# Patient Record
Sex: MALE | Race: WHITE | Employment: OTHER | URBAN - METROPOLITAN AREA
[De-identification: names, ages, dates, MRNs, and addresses within clinical notes are randomized per-mention and may not be internally consistent; named-entity substitution may affect disease eponyms.]

---

## 2017-04-23 ENCOUNTER — HOSPITAL ENCOUNTER (INPATIENT)
Age: 62
LOS: 3 days | Discharge: HOME | DRG: 247 | End: 2017-04-26
Attending: EMERGENCY MEDICINE | Admitting: INTERNAL MEDICINE
Payer: COMMERCIAL

## 2017-04-23 ENCOUNTER — APPOINTMENT (OUTPATIENT)
Dept: GENERAL RADIOLOGY | Age: 62
DRG: 247 | End: 2017-04-23
Attending: EMERGENCY MEDICINE
Payer: COMMERCIAL

## 2017-04-23 DIAGNOSIS — I21.09 ST ELEVATION MYOCARDIAL INFARCTION (STEMI) OF ANTERIOR WALL (HCC): Primary | ICD-10-CM

## 2017-04-23 PROBLEM — Z91.199 H/O NONCOMPLIANCE WITH MEDICAL TREATMENT, PRESENTING HAZARDS TO HEALTH: Status: ACTIVE | Noted: 2017-04-23

## 2017-04-23 PROBLEM — I21.11 ST ELEVATION (STEMI) MYOCARDIAL INFARCTION INVOLVING RIGHT CORONARY ARTERY (HCC): Status: ACTIVE | Noted: 2017-04-23

## 2017-04-23 PROBLEM — I21.11 ACUTE ST ELEVATION MYOCARDIAL INFARCTION (STEMI) INVOLVING RIGHT CORONARY ARTERY (HCC): Status: ACTIVE | Noted: 2017-04-23

## 2017-04-23 LAB
ALBUMIN SERPL BCP-MCNC: 3.2 G/DL (ref 3.5–5)
ALBUMIN/GLOB SERPL: 1 {RATIO} (ref 1.1–2.2)
ALP SERPL-CCNC: 75 U/L (ref 45–117)
ALT SERPL-CCNC: 17 U/L (ref 12–78)
ANION GAP BLD CALC-SCNC: 9 MMOL/L (ref 5–15)
APTT PPP: 29.1 SEC (ref 22.1–32.5)
AST SERPL W P-5'-P-CCNC: 10 U/L (ref 15–37)
BASOPHILS # BLD AUTO: 0 K/UL (ref 0–0.1)
BASOPHILS # BLD: 0 % (ref 0–1)
BILIRUB SERPL-MCNC: 0.4 MG/DL (ref 0.2–1)
BUN SERPL-MCNC: 16 MG/DL (ref 6–20)
BUN/CREAT SERPL: 12 (ref 12–20)
CALCIUM SERPL-MCNC: 9 MG/DL (ref 8.5–10.1)
CHLORIDE SERPL-SCNC: 106 MMOL/L (ref 97–108)
CO2 SERPL-SCNC: 28 MMOL/L (ref 21–32)
CREAT SERPL-MCNC: 1.31 MG/DL (ref 0.7–1.3)
EOSINOPHIL # BLD: 0.3 K/UL (ref 0–0.4)
EOSINOPHIL NFR BLD: 3 % (ref 0–7)
ERYTHROCYTE [DISTWIDTH] IN BLOOD BY AUTOMATED COUNT: 13.2 % (ref 11.5–14.5)
GLOBULIN SER CALC-MCNC: 3.3 G/DL (ref 2–4)
GLUCOSE SERPL-MCNC: 114 MG/DL (ref 65–100)
HCT VFR BLD AUTO: 49.3 % (ref 36.6–50.3)
HGB BLD-MCNC: 16.6 G/DL (ref 12.1–17)
INR PPP: 1 (ref 0.9–1.1)
LYMPHOCYTES # BLD AUTO: 22 % (ref 12–49)
LYMPHOCYTES # BLD: 2.3 K/UL (ref 0.8–3.5)
MCH RBC QN AUTO: 31 PG (ref 26–34)
MCHC RBC AUTO-ENTMCNC: 33.7 G/DL (ref 30–36.5)
MCV RBC AUTO: 92.1 FL (ref 80–99)
MONOCYTES # BLD: 0.6 K/UL (ref 0–1)
MONOCYTES NFR BLD AUTO: 6 % (ref 5–13)
NEUTS SEG # BLD: 7.2 K/UL (ref 1.8–8)
NEUTS SEG NFR BLD AUTO: 69 % (ref 32–75)
PLATELET # BLD AUTO: 198 K/UL (ref 150–400)
POTASSIUM SERPL-SCNC: 4.4 MMOL/L (ref 3.5–5.1)
PROT SERPL-MCNC: 6.5 G/DL (ref 6.4–8.2)
PROTHROMBIN TIME: 10.5 SEC (ref 9–11.1)
RBC # BLD AUTO: 5.35 M/UL (ref 4.1–5.7)
SODIUM SERPL-SCNC: 143 MMOL/L (ref 136–145)
THERAPEUTIC RANGE,PTTT: NORMAL SECS (ref 58–77)
TROPONIN I SERPL-MCNC: <0.04 NG/ML
WBC # BLD AUTO: 10.4 K/UL (ref 4.1–11.1)

## 2017-04-23 PROCEDURE — 80053 COMPREHEN METABOLIC PANEL: CPT | Performed by: EMERGENCY MEDICINE

## 2017-04-23 PROCEDURE — C1769 GUIDE WIRE: HCPCS

## 2017-04-23 PROCEDURE — 96374 THER/PROPH/DIAG INJ IV PUSH: CPT

## 2017-04-23 PROCEDURE — B2111ZZ FLUOROSCOPY OF MULTIPLE CORONARY ARTERIES USING LOW OSMOLAR CONTRAST: ICD-10-PCS | Performed by: INTERNAL MEDICINE

## 2017-04-23 PROCEDURE — 84484 ASSAY OF TROPONIN QUANT: CPT | Performed by: EMERGENCY MEDICINE

## 2017-04-23 PROCEDURE — 74011000250 HC RX REV CODE- 250

## 2017-04-23 PROCEDURE — 77030019698 HC SYR ANGI MDLON MRTM -A

## 2017-04-23 PROCEDURE — 36415 COLL VENOUS BLD VENIPUNCTURE: CPT | Performed by: EMERGENCY MEDICINE

## 2017-04-23 PROCEDURE — 71010 XR CHEST PORT: CPT

## 2017-04-23 PROCEDURE — 3E033PZ INTRODUCTION OF PLATELET INHIBITOR INTO PERIPHERAL VEIN, PERCUTANEOUS APPROACH: ICD-10-PCS | Performed by: INTERNAL MEDICINE

## 2017-04-23 PROCEDURE — 74011250637 HC RX REV CODE- 250/637: Performed by: EMERGENCY MEDICINE

## 2017-04-23 PROCEDURE — C1725 CATH, TRANSLUMIN NON-LASER: HCPCS

## 2017-04-23 PROCEDURE — C1874 STENT, COATED/COV W/DEL SYS: HCPCS

## 2017-04-23 PROCEDURE — 77030019569 HC BND COMPR RAD TERU -B

## 2017-04-23 PROCEDURE — 65660000000 HC RM CCU STEPDOWN

## 2017-04-23 PROCEDURE — 74011250637 HC RX REV CODE- 250/637: Performed by: INTERNAL MEDICINE

## 2017-04-23 PROCEDURE — 93041 RHYTHM ECG TRACING: CPT

## 2017-04-23 PROCEDURE — 85730 THROMBOPLASTIN TIME PARTIAL: CPT | Performed by: EMERGENCY MEDICINE

## 2017-04-23 PROCEDURE — 99152 MOD SED SAME PHYS/QHP 5/>YRS: CPT

## 2017-04-23 PROCEDURE — B2151ZZ FLUOROSCOPY OF LEFT HEART USING LOW OSMOLAR CONTRAST: ICD-10-PCS | Performed by: INTERNAL MEDICINE

## 2017-04-23 PROCEDURE — B246ZZZ ULTRASONOGRAPHY OF RIGHT AND LEFT HEART: ICD-10-PCS | Performed by: INTERNAL MEDICINE

## 2017-04-23 PROCEDURE — 77030019697 HC SYR ANGI INFL MRTM -B

## 2017-04-23 PROCEDURE — 99285 EMERGENCY DEPT VISIT HI MDM: CPT

## 2017-04-23 PROCEDURE — 77030010221 HC SPLNT WR POS TELE -B

## 2017-04-23 PROCEDURE — 93005 ELECTROCARDIOGRAM TRACING: CPT

## 2017-04-23 PROCEDURE — C1894 INTRO/SHEATH, NON-LASER: HCPCS

## 2017-04-23 PROCEDURE — C1887 CATHETER, GUIDING: HCPCS

## 2017-04-23 PROCEDURE — 96375 TX/PRO/DX INJ NEW DRUG ADDON: CPT

## 2017-04-23 PROCEDURE — 85610 PROTHROMBIN TIME: CPT | Performed by: EMERGENCY MEDICINE

## 2017-04-23 PROCEDURE — 85025 COMPLETE CBC W/AUTO DIFF WBC: CPT | Performed by: EMERGENCY MEDICINE

## 2017-04-23 PROCEDURE — 74011250636 HC RX REV CODE- 250/636: Performed by: INTERNAL MEDICINE

## 2017-04-23 PROCEDURE — 74011636320 HC RX REV CODE- 636/320

## 2017-04-23 PROCEDURE — 74011250636 HC RX REV CODE- 250/636

## 2017-04-23 PROCEDURE — 74011250636 HC RX REV CODE- 250/636: Performed by: EMERGENCY MEDICINE

## 2017-04-23 PROCEDURE — 027034Z DILATION OF CORONARY ARTERY, ONE ARTERY WITH DRUG-ELUTING INTRALUMINAL DEVICE, PERCUTANEOUS APPROACH: ICD-10-PCS | Performed by: INTERNAL MEDICINE

## 2017-04-23 PROCEDURE — 74011000258 HC RX REV CODE- 258: Performed by: INTERNAL MEDICINE

## 2017-04-23 PROCEDURE — 4A023N7 MEASUREMENT OF CARDIAC SAMPLING AND PRESSURE, LEFT HEART, PERCUTANEOUS APPROACH: ICD-10-PCS | Performed by: INTERNAL MEDICINE

## 2017-04-23 PROCEDURE — 77030004549 HC CATH ANGI DX PRF MRTM -A

## 2017-04-23 PROCEDURE — 77030015766

## 2017-04-23 PROCEDURE — 77010033678 HC OXYGEN DAILY

## 2017-04-23 RX ORDER — HEPARIN SODIUM 200 [USP'U]/100ML
INJECTION, SOLUTION INTRAVENOUS
Status: COMPLETED
Start: 2017-04-23 | End: 2017-04-23

## 2017-04-23 RX ORDER — HEPARIN SODIUM 1000 [USP'U]/ML
2500 INJECTION, SOLUTION INTRAVENOUS; SUBCUTANEOUS ONCE
Status: COMPLETED | OUTPATIENT
Start: 2017-04-23 | End: 2017-04-23

## 2017-04-23 RX ORDER — ATROPINE SULFATE 0.1 MG/ML
INJECTION INTRAVENOUS
Status: DISCONTINUED
Start: 2017-04-23 | End: 2017-04-23 | Stop reason: ALTCHOICE

## 2017-04-23 RX ORDER — SODIUM CHLORIDE 0.9 % (FLUSH) 0.9 %
5-10 SYRINGE (ML) INJECTION AS NEEDED
Status: DISCONTINUED | OUTPATIENT
Start: 2017-04-23 | End: 2017-04-26 | Stop reason: HOSPADM

## 2017-04-23 RX ORDER — MORPHINE SULFATE 2 MG/ML
2 INJECTION, SOLUTION INTRAMUSCULAR; INTRAVENOUS
Status: DISCONTINUED | OUTPATIENT
Start: 2017-04-23 | End: 2017-04-23 | Stop reason: ALTCHOICE

## 2017-04-23 RX ORDER — HEPARIN SODIUM 200 [USP'U]/100ML
500 INJECTION, SOLUTION INTRAVENOUS ONCE
Status: COMPLETED | OUTPATIENT
Start: 2017-04-23 | End: 2017-04-23

## 2017-04-23 RX ORDER — HEPARIN SODIUM 1000 [USP'U]/ML
INJECTION, SOLUTION INTRAVENOUS; SUBCUTANEOUS
Status: COMPLETED
Start: 2017-04-23 | End: 2017-04-23

## 2017-04-23 RX ORDER — SODIUM CHLORIDE 9 MG/ML
125 INJECTION, SOLUTION INTRAVENOUS CONTINUOUS
Status: DISCONTINUED | OUTPATIENT
Start: 2017-04-23 | End: 2017-04-26 | Stop reason: HOSPADM

## 2017-04-23 RX ORDER — ATROPINE SULFATE 0.1 MG/ML
1 INJECTION INTRAVENOUS ONCE
Status: COMPLETED | OUTPATIENT
Start: 2017-04-23 | End: 2017-04-23

## 2017-04-23 RX ORDER — ATORVASTATIN CALCIUM 40 MG/1
40 TABLET, FILM COATED ORAL DAILY
Status: DISCONTINUED | OUTPATIENT
Start: 2017-04-23 | End: 2017-04-26 | Stop reason: HOSPADM

## 2017-04-23 RX ORDER — ALFUZOSIN HYDROCHLORIDE 10 MG/1
TABLET, EXTENDED RELEASE ORAL DAILY
COMMUNITY
End: 2020-06-10

## 2017-04-23 RX ORDER — HEPARIN SODIUM 5000 [USP'U]/ML
5000 INJECTION, SOLUTION INTRAVENOUS; SUBCUTANEOUS
Status: COMPLETED | OUTPATIENT
Start: 2017-04-23 | End: 2017-04-23

## 2017-04-23 RX ORDER — FENTANYL CITRATE 50 UG/ML
INJECTION, SOLUTION INTRAMUSCULAR; INTRAVENOUS
Status: COMPLETED
Start: 2017-04-23 | End: 2017-04-23

## 2017-04-23 RX ORDER — LIDOCAINE HYDROCHLORIDE 10 MG/ML
INJECTION, SOLUTION EPIDURAL; INFILTRATION; INTRACAUDAL; PERINEURAL
Status: COMPLETED
Start: 2017-04-23 | End: 2017-04-23

## 2017-04-23 RX ORDER — LIDOCAINE HYDROCHLORIDE 10 MG/ML
1-30 INJECTION, SOLUTION EPIDURAL; INFILTRATION; INTRACAUDAL; PERINEURAL
Status: DISCONTINUED | OUTPATIENT
Start: 2017-04-23 | End: 2017-04-23 | Stop reason: ALTCHOICE

## 2017-04-23 RX ORDER — AMIODARONE HYDROCHLORIDE 150 MG/3ML
INJECTION, SOLUTION INTRAVENOUS
Status: DISCONTINUED
Start: 2017-04-23 | End: 2017-04-23 | Stop reason: ALTCHOICE

## 2017-04-23 RX ORDER — LISINOPRIL 5 MG/1
2.5 TABLET ORAL DAILY
Status: DISCONTINUED | OUTPATIENT
Start: 2017-04-24 | End: 2017-04-26 | Stop reason: HOSPADM

## 2017-04-23 RX ORDER — METOPROLOL TARTRATE 25 MG/1
12.5 TABLET, FILM COATED ORAL EVERY 12 HOURS
Status: DISCONTINUED | OUTPATIENT
Start: 2017-04-23 | End: 2017-04-26 | Stop reason: HOSPADM

## 2017-04-23 RX ORDER — SODIUM CHLORIDE 0.9 % (FLUSH) 0.9 %
5-10 SYRINGE (ML) INJECTION EVERY 8 HOURS
Status: DISCONTINUED | OUTPATIENT
Start: 2017-04-23 | End: 2017-04-26 | Stop reason: HOSPADM

## 2017-04-23 RX ORDER — SODIUM CHLORIDE 900 MG/100ML
INJECTION INTRAVENOUS
Status: DISCONTINUED
Start: 2017-04-23 | End: 2017-04-26 | Stop reason: HOSPADM

## 2017-04-23 RX ORDER — GUAIFENESIN 100 MG/5ML
81 LIQUID (ML) ORAL DAILY
Status: DISCONTINUED | OUTPATIENT
Start: 2017-04-24 | End: 2017-04-26 | Stop reason: HOSPADM

## 2017-04-23 RX ORDER — MORPHINE SULFATE 2 MG/ML
2 INJECTION, SOLUTION INTRAMUSCULAR; INTRAVENOUS
Status: COMPLETED | OUTPATIENT
Start: 2017-04-23 | End: 2017-04-23

## 2017-04-23 RX ORDER — FENTANYL CITRATE 50 UG/ML
25-50 INJECTION, SOLUTION INTRAMUSCULAR; INTRAVENOUS
Status: DISCONTINUED | OUTPATIENT
Start: 2017-04-23 | End: 2017-04-23 | Stop reason: ALTCHOICE

## 2017-04-23 RX ORDER — VERAPAMIL HYDROCHLORIDE 2.5 MG/ML
2.5 INJECTION, SOLUTION INTRAVENOUS ONCE
Status: COMPLETED | OUTPATIENT
Start: 2017-04-23 | End: 2017-04-23

## 2017-04-23 RX ORDER — NITROGLYCERIN 20 MG/100ML
5 INJECTION INTRAVENOUS
Status: DISCONTINUED | OUTPATIENT
Start: 2017-04-23 | End: 2017-04-23 | Stop reason: ALTCHOICE

## 2017-04-23 RX ORDER — ALFUZOSIN HYDROCHLORIDE 10 MG/1
10 TABLET, EXTENDED RELEASE ORAL DAILY
COMMUNITY
End: 2017-04-26

## 2017-04-23 RX ORDER — BIVALIRUDIN 250 MG/5ML
INJECTION, POWDER, LYOPHILIZED, FOR SOLUTION INTRAVENOUS
Status: DISCONTINUED
Start: 2017-04-23 | End: 2017-04-26 | Stop reason: HOSPADM

## 2017-04-23 RX ORDER — VERAPAMIL HYDROCHLORIDE 2.5 MG/ML
INJECTION, SOLUTION INTRAVENOUS
Status: COMPLETED
Start: 2017-04-23 | End: 2017-04-23

## 2017-04-23 RX ORDER — MORPHINE SULFATE 2 MG/ML
INJECTION, SOLUTION INTRAMUSCULAR; INTRAVENOUS
Status: COMPLETED
Start: 2017-04-23 | End: 2017-04-23

## 2017-04-23 RX ORDER — MORPHINE SULFATE 10 MG/ML
2 INJECTION, SOLUTION INTRAMUSCULAR; INTRAVENOUS
Status: DISCONTINUED | OUTPATIENT
Start: 2017-04-23 | End: 2017-04-26 | Stop reason: HOSPADM

## 2017-04-23 RX ORDER — MIDAZOLAM HYDROCHLORIDE 1 MG/ML
INJECTION, SOLUTION INTRAMUSCULAR; INTRAVENOUS
Status: COMPLETED
Start: 2017-04-23 | End: 2017-04-23

## 2017-04-23 RX ORDER — ONDANSETRON 2 MG/ML
4 INJECTION INTRAMUSCULAR; INTRAVENOUS
Status: COMPLETED | OUTPATIENT
Start: 2017-04-23 | End: 2017-04-23

## 2017-04-23 RX ORDER — MIDAZOLAM HYDROCHLORIDE 1 MG/ML
.5-2 INJECTION, SOLUTION INTRAMUSCULAR; INTRAVENOUS
Status: DISCONTINUED | OUTPATIENT
Start: 2017-04-23 | End: 2017-04-23 | Stop reason: ALTCHOICE

## 2017-04-23 RX ADMIN — SODIUM CHLORIDE 125 ML/HR: 900 INJECTION, SOLUTION INTRAVENOUS at 14:07

## 2017-04-23 RX ADMIN — Medication 10 ML: at 20:48

## 2017-04-23 RX ADMIN — ONDANSETRON HYDROCHLORIDE 4 MG: 2 INJECTION, SOLUTION INTRAMUSCULAR; INTRAVENOUS at 13:47

## 2017-04-23 RX ADMIN — IOPAMIDOL 130 ML: 755 INJECTION, SOLUTION INTRAVENOUS at 15:13

## 2017-04-23 RX ADMIN — Medication 5 ML: at 17:00

## 2017-04-23 RX ADMIN — MIDAZOLAM HYDROCHLORIDE 2 MG: 1 INJECTION, SOLUTION INTRAMUSCULAR; INTRAVENOUS at 14:54

## 2017-04-23 RX ADMIN — HEPARIN SODIUM 1000 UNITS: 200 INJECTION, SOLUTION INTRAVENOUS at 14:38

## 2017-04-23 RX ADMIN — ATORVASTATIN CALCIUM 40 MG: 40 TABLET, FILM COATED ORAL at 16:29

## 2017-04-23 RX ADMIN — FENTANYL CITRATE 50 MCG: 50 INJECTION, SOLUTION INTRAMUSCULAR; INTRAVENOUS at 14:20

## 2017-04-23 RX ADMIN — BIVALIRUDIN 0.5 MG/KG/HR: 250 INJECTION, POWDER, LYOPHILIZED, FOR SOLUTION INTRAVENOUS at 14:59

## 2017-04-23 RX ADMIN — HEPARIN SODIUM 2500 UNITS: 1000 INJECTION, SOLUTION INTRAVENOUS; SUBCUTANEOUS at 14:30

## 2017-04-23 RX ADMIN — LIDOCAINE HYDROCHLORIDE 1 ML: 10 INJECTION, SOLUTION EPIDURAL; INFILTRATION; INTRACAUDAL; PERINEURAL at 14:29

## 2017-04-23 RX ADMIN — Medication 2 MG: at 14:01

## 2017-04-23 RX ADMIN — VERAPAMIL HYDROCHLORIDE 2.5 MG: 2.5 INJECTION, SOLUTION INTRAVENOUS at 14:30

## 2017-04-23 RX ADMIN — VERAPAMIL HYDROCHLORIDE 2.5 MG: 2.5 INJECTION INTRAVENOUS at 14:30

## 2017-04-23 RX ADMIN — MIDAZOLAM HYDROCHLORIDE 2 MG: 1 INJECTION, SOLUTION INTRAMUSCULAR; INTRAVENOUS at 14:20

## 2017-04-23 RX ADMIN — FENTANYL CITRATE 50 MCG: 50 INJECTION, SOLUTION INTRAMUSCULAR; INTRAVENOUS at 14:35

## 2017-04-23 RX ADMIN — MORPHINE SULFATE 2 MG: 10 INJECTION INTRAMUSCULAR; INTRAVENOUS; SUBCUTANEOUS at 20:52

## 2017-04-23 RX ADMIN — TICAGRELOR 180 MG: 90 TABLET ORAL at 14:02

## 2017-04-23 RX ADMIN — MIDAZOLAM HYDROCHLORIDE 2 MG: 1 INJECTION INTRAMUSCULAR; INTRAVENOUS at 14:20

## 2017-04-23 RX ADMIN — HEPARIN SODIUM 5000 UNITS: 5000 INJECTION, SOLUTION INTRAVENOUS; SUBCUTANEOUS at 13:46

## 2017-04-23 RX ADMIN — ATROPINE SULFATE 1 MG: 0.1 INJECTION PARENTERAL at 14:43

## 2017-04-23 RX ADMIN — NITROGLYCERIN 200 MCG: 5 INJECTION, SOLUTION INTRAVENOUS at 14:30

## 2017-04-23 RX ADMIN — MIDAZOLAM HYDROCHLORIDE 1 MG: 1 INJECTION, SOLUTION INTRAMUSCULAR; INTRAVENOUS at 14:33

## 2017-04-23 RX ADMIN — Medication 2 MG: at 13:48

## 2017-04-23 RX ADMIN — BIVALIRUDIN 1.75 MG/KG/HR: 250 INJECTION, POWDER, LYOPHILIZED, FOR SOLUTION INTRAVENOUS at 14:36

## 2017-04-23 RX ADMIN — METOPROLOL TARTRATE 12.5 MG: 25 TABLET ORAL at 16:29

## 2017-04-23 NOTE — ED TRIAGE NOTES
Pt arrives via EMS as pre-alert STEMI by EMS    Pt cutting grass and developed chest pain approx 15 min PTA of EMS Pt reports hx of MI with stent placement approx 5-6 years ago Pt received 2 ASA and 2 SL Nitro via EMS Pt arrives with IV 18 gauge in L AC 1 L of NS infused during transport.      Dr Mahnaz De La Rosa bedside upon arrival EKG completed upon arrival.

## 2017-04-23 NOTE — ED NOTES
Pt reports no relief from IV Morphine Dr Jaydon Young aware   Dr Jaydon Young speaking with Cardiology on phone regarding pt pain and further plan.

## 2017-04-23 NOTE — ED NOTES
Dr Kayden Duque and Cath lab bedside Report to cath lab personal     Pt signed consents with cath lab personal     Nitro sent to cath lab with cath team and MD

## 2017-04-23 NOTE — PROGRESS NOTES
TRANSFER - OUT REPORT:    Verbal report given to Joelle(name) on Petr Shettys.  being transferred to [facility](unit) for routine progression of care       Report consisted of patients Situation, Background, Assessment and   Recommendations(SBAR). Information from the following report(s) SBAR, Kardex, Procedure Summary, Intake/Output and MAR was reviewed with the receiving nurse. Opportunity for questions and clarification was provided.       Patient transported with:   Registered Nurse  Tech

## 2017-04-23 NOTE — IP AVS SNAPSHOT
[address] [facility] 
[phone] Patient: Joanna Polanco. MRN: [id number] YCS:7[birth year]38/[birth year]173 You are allergic to the following Allergen Reactions Pcn (Penicillins) Swelling Recent Documentation Height Weight BMI  
  
  
 1.778 m 81.1 kg 25.65 kg/m2 Unresulted Labs Order Current Status SAMPLE TO BLOOD BANK In process Emergency Contacts Name Discharge Info Relation Home Work Mobile Henny Clement  Spouse [3] [phone] About your hospitalization You were admitted on:  April 23, 2017 You last received care in the:  [facility] 2 INTRVNTNL CARDIO You were discharged on:  April 26, 2017 Unit phone number:  [phone] Why you were hospitalized Your primary diagnosis was: St Elevation [facility]) Myocardial Infarction Involving Right Coronary Artery (Hcc) Your diagnoses also included: Tobacco Use Disorder, Coronary Artery Disease, Hyperlipidemia With Target Low Density Lipoprotein (Ldl) Cholesterol Less Than 70 Mg/Dl, S/P Ptca (Percutaneous Transluminal Coronary Angioplasty), Acute St Elevation Myocardial Infarction (Stemi) Involving Right Coronary Artery (Hcc), H/O Noncompliance With Medical Treatment, Presenting Hazards To Health Providers Seen During Your Hospitalizations Provider Role Specialty Primary office phone Tejas Saucedo MD Attending Provider Emergency Medicine [phone] Camila Contreras MD Attending Provider Cardiology [phone] Your Primary Care Physician (PCP) Primary Care Physician Office Phone Office Fax Edgard Hiro [phone] Follow-up Information Follow up With Details Comments Contact Info Camila Contreras MD Schedule an appointment as soon as possible for a visit on 5/12/2017 at 2:15PM at [place] [id number] [facility] [address] [address] [phone] Efrain Rose MD   [address]  Emergency Dept [facility] [place] 
[phone] Your Appointments Friday May 12, 2017  2:15 PM EDT HOSPITAL FOLLOW-UP with Chidi Yip MD  
[facility] [address] ([facility]) [address] [place] [address]  
[phone] Current Discharge Medication List  
  
START taking these medications Dose & Instructions Dispensing Information Comments Morning Noon Evening Bedtime  
 aspirin delayed-release 81 mg tablet Replaces:  aspirin 325 mg tablet Your last dose was: Your next dose is:    
   
   
 Dose:  81 mg Take 1 Tab by mouth daily. Quantity:  30 Tab Refills:  11  
     
   
   
   
  
 isosorbide mononitrate ER 30 mg tablet Commonly known as:  IMDUR Your last dose was: Your next dose is:    
   
   
 Dose:  30 mg Take 1 Tab by mouth daily. Quantity:  30 Tab Refills:  11  
     
   
   
   
  
 lisinopril 2.5 mg tablet Commonly known as:  [place] Your last dose was: Your next dose is:    
   
   
 Dose:  2.5 mg Take 1 Tab by mouth daily. Quantity:  30 Tab Refills:  11  
     
   
   
   
  
 metoprolol tartrate 25 mg tablet Commonly known as:  LOPRESSOR Your last dose was: Your next dose is:    
   
   
 Dose:  12.5 mg Take 0.5 Tabs by mouth every twelve (12) hours. Quantity:  30 Tab Refills:  11  
     
   
   
   
  
 ticagrelor 90 mg tablet Commonly known as:  [place] Your last dose was: Your next dose is:    
   
   
 Dose:  90 mg Take 1 Tab by mouth every twelve (12) hours every twelve (12) hours. Quantity:  60 Tab Refills:  11 CONTINUE these medications which have CHANGED Dose & Instructions Dispensing Information Comments Morning Noon Evening Bedtime alfuzosin SR 10 mg SR tablet Commonly known as:  Kimberly Damico What changed:  Another medication with the same name was removed. Continue taking this medication, and follow the directions you see here. Your last dose was: Your next dose is: Take  by mouth daily. Refills:  0 CONTINUE these medications which have NOT CHANGED Dose & Instructions Dispensing Information Comments Morning Noon Evening Bedtime  
 rosuvastatin 20 mg tablet Commonly known as:  CRESTOR Your last dose was: Your next dose is:    
   
   
 Dose:  20 mg Take 1 Tab by mouth nightly. Quantity:  30 Tab Refills:  0 Patient needs appt with Dr. Vandana Heart before any additional refills are given. STOP taking these medications   
 aspirin 325 mg tablet Commonly known as:  ASPIRIN Replaced by:  aspirin delayed-release 81 mg tablet Where to Get Your Medications These medications were sent to [facility] [address]  [facility], [address] Phone:  [phone]  
  aspirin delayed-release 81 mg tablet  
 isosorbide mononitrate ER 30 mg tablet  
 lisinopril 2.5 mg tablet  
 metoprolol tartrate 25 mg tablet  
 ticagrelor 90 mg tablet Discharge Instructions [address] [facility], [address] [place], [place] [id number] Patient ID: 
Kailey Huff 
[id number] 
57 y.o. 
1955 Admit Date: 4/23/2017 Discharge Date: 4/26/2017 Admitting Physician: Dante Mcclendon MD  
 
Discharge Physician: Gifty Hopper NP Admission Diagnoses:  
Acute ST elevation myocardial infarction (STEMI) involving right coronary artery ([facility] Discharge Diagnoses:  
Principal Problem: 
  ST elevation (STEMI) myocardial infarction involving right coronary artery ([facility] (4/23/2017) Active Problems: Tobacco use disorder (6/2/2010) Coronary artery disease (6/11/2010) Hyperlipidemia with target low density lipoprotein (LDL) cholesterol less than 70 mg/dL (6/11/2010) S/P PTCA (percutaneous transluminal coronary angioplasty) (6/11/2010) Acute ST elevation myocardial infarction (STEMI) involving right coronary artery ([facility] [address] (4/23/2017) H/O noncompliance with medical treatment, presenting hazards to health (4/23/2017) Discharge Condition: Good Cardiology Procedures this Admission:  Left heart catheterization with PCI Disposition: home Reference discharge instructions provided by nursing for diet and activity. Signed: 
Melvin Goodman NP 
4/26/2017 
11:13 AM 
 
 
Radial Cardiac Catheterization/Angiography Discharge Instructions It is normal to feel tired the first couple days. Take it easy and follow the physicians instructions. CHECK THE CATHETER INSERTION SITE DAILY: 
 
Remove the wrist dressing 24 hours after the procedure. You may shower 24 hours after the procedure. Wash with soap and water and pat dry. Gentle cleaning of the site with soap and water is sufficient, cover with a dry clean dressing or bandage. Do not apply creams or powders to the area. No soaking the wrist for 3 days. Leave the puncture site open to air after 24 hours post-procedure. CALL THE PHYSICIANS:  
 
If the site becomes red, swollen or feels warm to the touch If there is bleeding or drainage or if there is unusual pain at the radial site. If there is any minor oozing, you may apply a band-aid and remove after 12 hours. If the bleeding continues, hold pressure with the middle finger against the puncture site and the thumb against the back of the wrist,call 911 to be transported to the hospital. 
DO NOT DRIVE YOURSELF, [address]  
 
ACTIVITY:  
For the first 24 hours do not manipulate the wrist. 
 No lifting, pushing or pulling over 3-5 pounds with the affected wrist for 7 daysand no straining the insertion site. Do not life grocery bags or the garbage can, do not run the vacuum  or  for 7 days. Start with short walks as in the hospital and gradually increase as tolerated each day. It is recommended to walk 30 minutes 5-7 days per week. Follow your physicians instructions on activity. Avoid walking outside in extremes of heat or cold. Walk inside when it is cold and windy or hot and humid. Things to keep in mind: 
No driving for at least 24 hours, or as designated by your physician. Limit the number of times you go up and down the stairs Take rests and pace yourself with activity. Be careful and do not strain with bowel movements. MEDICATIONS: 
 
Take all medications as prescribed Call your physician if you have any questions Keep an updated list of your medications with you at all times and give a list to your physician and pharmacist 
 
SIGNS AND SYMPTOMS:  
Be cautious of symptoms of angina or recurrent symptoms such as chest discomfort, unusual shortness of breath or fatigue. These could be symptoms of restenosis, a new blockage or a heart attack. If your symptoms are relieved with rest it is still recommended that you notify your physician of recurrent chest pain or discomfort. For CHEST PAIN or symptoms of angina not relieved with rest:  If the discomfort is not relieved with rest, and you have been prescribed Nitroglycerin, take as directed (taken under the tongue, one at a time 5 minutes apart for a total of 3 doses). If the discomfort is not relieved after the 3rd nitroglycerin, call 911. If you have not been prescribed Nitroglycerin  and your chest discomfort is not relieved with rest, call 911. AFTER CARE:  
Follow up with your physician as instructed.  
Follow a heart healthy diet with proper portion control, daily stress management, daily exercise, blood pressure and cholesterol control , and smoking cessation. Discharge Orders None Introducing [facility]! John Burgess introduces [facility] patient portal. Now you can access parts of your medical record, email your doctor's office, and request medication refills online. 1. In your internet browser, go to [email or web address] 2. Click on the First Time User? Click Here link in the Sign In box. You will see the New Member Sign Up page. 3. Enter your [place] Access Code exactly as it appears below. You will not need to use this code after youve completed the sign-up process. If you do not sign up before the expiration date, you must request a new code. · [facility] Access Code: [email or web address] Expires: 6/12/2017 11:12 AM 
 
4. Enter the last four digits of your Social Security Number (xxxx) and Date of Birth (mm/dd/yyyy) as indicated and click Submit. You will be taken to the next sign-up page. 5. Create a [facility] ID. This will be your [facility] login ID and cannot be changed, so think of one that is secure and easy to remember. 6. Create a [place] password. You can change your password at any time. 7. Enter your Password Reset Question and Answer. This can be used at a later time if you forget your password. 8. Enter your e-mail address. You will receive e-mail notification when new information is available in [address] 9. Click Sign Up. You can now view and download portions of your medical record. 10. Click the Download Summary menu link to download a portable copy of your medical information. If you have questions, please visit the Frequently Asked Questions section of the [facility] website. Remember, [facility] is NOT to be used for urgent needs. For medical emergencies, dial 911. Now available from your iPhone and Android! General Information Please provide this summary of care documentation to your next provider. Patient Signature:  ____________________________________________________________ Date:  ____________________________________________________________  
  
Vaishnavi Juliane Provider Signature:  ____________________________________________________________ Date:  ____________________________________________________________

## 2017-04-23 NOTE — H&P
CARDIOLOGY H&P       Date of  Admission: 4/23/2017  1:38 PM     Admission type:Emergency    Consult for: STEMI  Consulted by: Dr. Leighann Shabazz     Subjective:     Tejas Desai is a 57 y.o. male with history significant for HTN and CAD presenting via EMS to [facility] ED with c/o sudden onset of chest pain x 30 minutes prior to arrival when the pt states he was cutting grass. EMS reports administrating 2 NSL Nitro en route with 1L bolus en route. Pt informs ingesting 2 ASA prior to EMS arrival. Pt reports history of MI x 5-6 years with placement of stents. Pt states he is still in pain. Pt specifically denies any nausea, vomiting, fevers, chills, abdominal pain, or SOB. EKG on arrival revealed significant inferior ST elevation with reciprocal changes. Patient was consented for and brought emergently to the cardiac catheterization laboratory for PCI. He received heparin bolus and 180 mg of Brilinta as well as morphine in the emergency room prior to catheterization.     Patient Active Problem List    Diagnosis Date Noted    ST elevation (STEMI) myocardial infarction involving right coronary artery ([facility] [address] 04/23/2017    Acute ST elevation myocardial infarction (STEMI) involving right coronary artery ([address] 04/23/2017    H/O noncompliance with medical treatment, presenting hazards to health 04/23/2017    Coronary artery disease 06/11/2010    Hyperlipidemia with target low density lipoprotein (LDL) cholesterol less than 70 mg/dL 06/11/2010    S/P PTCA (percutaneous transluminal coronary angioplasty) 06/11/2010    Unstable angina ([facility] [facility] 06/02/2010    Tobacco use disorder 06/02/2010      Peter Cintron MD  Past Medical History:   Diagnosis Date    CAD (coronary artery disease)     stent 6/10    Hypertension     Other ill-defined conditions     high cholestrol      Social History     Social History    Marital status: UNKNOWN     Spouse name: N/A    Number of children: N/A    Years of education: N/A Social History Main Topics    Smoking status: Former Smoker     Packs/day: 0.50     Years: 38.00    Smokeless tobacco: None      Comment: trying to quit tapering off    Alcohol use No      Comment: since 01/10, 1 beer    Drug use: Yes     Special: Prescription, OTC    Sexual activity: Yes     Other Topics Concern    None     Social History Narrative     Allergies   Allergen Reactions    Pcn [Penicillins] Swelling      Family History   Problem Relation Age of Onset    Kidney Disease Mother     Cancer Father       Current Facility-Administered Medications   Medication Dose Route Frequency    sodium chloride (NS) flush 5-10 mL  5-10 mL IntraVENous Q8H    sodium chloride (NS) flush 5-10 mL  5-10 mL IntraVENous PRN    0.9% sodium chloride infusion  125 mL/hr IntraVENous CONTINUOUS    bivalirudin (ANGIOMAX) 250 mg injection        0.9% sodium chloride (MBP/ADV) 0.9 % infusion        ADDaptor        bivalirudin (ANGIOMAX) 250 mg in 0.9% sodium chloride (MBP/ADV) 50 mL  0.5 mg/kg/hr IntraVENous CONTINUOUS    atorvastatin (LIPITOR) tablet 40 mg  40 mg Oral DAILY    sodium chloride (NS) flush 5-10 mL  5-10 mL IntraVENous Q8H    sodium chloride (NS) flush 5-10 mL  5-10 mL IntraVENous PRN    [START ON 4/24/2017] lisinopril (PRINIVIL, ZESTRIL) tablet 2.5 mg  2.5 mg Oral DAILY    metoprolol tartrate (LOPRESSOR) tablet 12.5 mg  12.5 mg Oral Q12H    [START ON 4/24/2017] aspirin chewable tablet 81 mg  81 mg Oral DAILY    morphine injection 2 mg  2 mg IntraVENous Q2H PRN         Review of Symptoms:  11 systems reviewed, negative other than as stated in HPI     Subjective:      Visit Vitals    /77    Pulse 87    Temp 98.6 °F (37 °C)    Resp 16    Ht 5' 10\" (1.778 m)    Wt 180 lb (81.6 kg)    SpO2 93%    BMI 25.83 kg/m2       Physical:  Gen: In moderate distress secondary to chest pain  Heart: regular rhythm, no murmur, gallop or rub  Lungs: clear to auscultation bilaterally  Neck: supple, symmetrical, trachea midline, no adenopathy, thyroid: not enlarged, symmetric, no tenderness/mass/nodules, no carotid bruit and no JVD  Abdomen: soft, non-tender.  Bowel sounds normal. No masses,  no organomegaly  Extremities: extremities normal, atraumatic, no cyanosis or edema, positive femorals without bruits, normal dp pulses  Neurologic: CN, motor and speech grossly normal    Data Review:   Recent Labs      04/23/17   1340   WBC  10.4   HGB  16.6   HCT  49.3   PLT  198     Recent Labs      04/23/17   1340   NA  143   K  4.4   CL  106   CO2  28   GLU  114*   BUN  16   CREA  1.31*   CA  9.0   ALB  3.2*   TBILI  0.4   SGOT  10*   ALT  17   INR  1.0       Recent Labs      04/23/17   1340   TROIQ  <0.04       @BRIEFLAB(CHOL,CHOLPOCT,[id number],CHOLX,CHOLP,CHLST,CHOLV,851973,HDL,HDLPOCT,HDLPOC,382587,NHDLCT,[id number],HDLC,HDLP,LDL,LDLPOCT,LDLCPOC,199486,NLDLCT,DLDL,LDLC,DLDLP,513581,VLDLC,VLDL,TGL,TGLX,TRIGL,[id number],TRIGP,TGLPOCT,418760,449971,CHHD,CHHDX)@    No intake or output data in the 24 hours ending 04/23/17 1616     Cardiographics    Telemetry: normal sinus rhythm    ECG: normal sinus rhythm, ST elevation in inferior leads with reciprocal changes    Echocardiogram: Not done recently, previously normal ejection fraction, ordered for tomorrow     Assessment:         Principal Problem:    ST elevation (STEMI) myocardial infarction involving right coronary artery ([facility] (4/23/2017)    Active Problems:    Tobacco use disorder (6/2/2010)      Coronary artery disease (6/11/2010)      Hyperlipidemia with target low density lipoprotein (LDL) cholesterol less than 70 mg/dL (6/11/2010)      S/P PTCA (percutaneous transluminal coronary angioplasty) (6/11/2010)      Acute ST elevation myocardial infarction (STEMI) involving right coronary artery ([facility] [address] (4/23/2017)      H/O noncompliance with medical treatment, presenting hazards to health (4/23/2017)         Plan:     57 y.o. with multiple CV risk factors, admitted with STEMI involving the right coronary artery as described above:  Status post successful primary PCI of the right coronary artery as detailed in separate cath note  Admit to IVCU  Evaluate for extent of MI by serial cardiac markers  ASA, Brilinta, beta-blocker, statin, ACEI. I have discussed the risks and benefits of cardiac catheterization +/- percutaneous coronary intervention. The patient expressed understanding and wished to proceed. Counseled on smoking cessation and need for strict medication compliance. He has been noncompliant in the past.  The patient knows to call a nurse immediately if any recurrence/ progression of symptoms.

## 2017-04-23 NOTE — ED PROVIDER NOTES
HPI Comments: Viviana Ashley is a 57 y.o. male with history significant for HTN and CAD presenting via EMS to ED [facility] ED with c/o sudden onset of chest pain x 30 minutes prior to arrival when the pt states he was cutting grass. EMS reports administrating 2 NSL Nitro en route with 1L bolus en route. Pt informs ingesting 2 ASA prior to EMS arrival. Pt reports history of MI x 5-6 years with placement of stents. Pt states he is still in pain. Pt specifically denies any nausea, vomiting, fevers, chills, abdominal pain, or SOB. PCP: Pamela Meadows MD    PMHx: hyperlipidemia   PSHx: tonsillectomy   Social Hx: + EtOH; + Smoker; - Illicit Drugs    There are no other changes, complaints or physical findings at this time. Written by Blaze Rich, ED Scribe, as dictated by Maciej Burnham MD.     The history is provided by the patient and the EMS personnel. Past Medical History:   Diagnosis Date    CAD (coronary artery disease)     stent 6/10    Hypertension     Other ill-defined conditions     high cholestrol     Past Surgical History:   Procedure Laterality Date    CARDIAC CATHETERIZATION  6/10/2010         DRUG ELUTING STENT SINGLE VESS  6/10/2010         FRACTIONAL FLOW RESERVE  6/10/2010         HC ANGIOSEAL VIP 6FR  6/10/2010         HX TONSILLECTOMY       Family History:   Problem Relation Age of Onset    Kidney Disease Mother     Cancer Father      Social History     Social History    Marital status: UNKNOWN     Spouse name: N/A    Number of children: N/A    Years of education: N/A     Occupational History    Not on file.      Social History Main Topics    Smoking status: Former Smoker     Packs/day: 0.50     Years: 38.00    Smokeless tobacco: Not on file      Comment: trying to quit tapering off    Alcohol use No      Comment: since 01/10, 1 beer    Drug use: Yes     Special: Prescription, OTC    Sexual activity: Yes     Other Topics Concern    Not on file     Social History Narrative     ALLERGIES: Pcn [penicillins]    Review of Systems   Constitutional: Negative. Negative for chills and fever. HENT: Negative. Negative for congestion, rhinorrhea and sinus pressure. Eyes: Negative. Negative for discharge and redness. Respiratory: Negative. Negative for chest tightness and shortness of breath. Cardiovascular: Positive for chest pain. Gastrointestinal: Negative. Negative for abdominal pain and blood in stool. Endocrine: Negative. Genitourinary: Negative. Negative for flank pain and hematuria. Musculoskeletal: Negative. Negative for back pain. Skin: Negative. Negative for rash. Neurological: Negative. Negative for dizziness, seizures, weakness, numbness and headaches. Hematological: Negative. All other systems reviewed and are negative. Patient Vitals for the past 12 hrs:   Pulse Resp BP SpO2   04/23/17 1400 (!) 54 17 150/86 98 %   04/23/17 1355 60 15 137/88 97 %   04/23/17 1347 (!) 58 14 (!) 152/97 98 %     Physical Exam   Constitutional: He is oriented to person, place, and time. He appears well-developed and well-nourished. He appears distressed. HENT:   Head: Normocephalic and atraumatic. Nose: Nose normal.   Mouth/Throat: No oropharyngeal exudate. Eyes: Conjunctivae and EOM are normal. Pupils are equal, round, and reactive to light. No scleral icterus. Neck: Normal range of motion. Neck supple. No JVD present. No thyromegaly present. Cardiovascular: Regular rhythm, normal heart sounds, intact distal pulses and normal pulses. Bradycardia present. PMI is not displaced. Exam reveals no gallop and no friction rub. No murmur heard. Pulses:       Radial pulses are 2+ on the right side, and 2+ on the left side. Dorsalis pedis pulses are 2+ on the right side, and 2+ on the left side. Pulmonary/Chest: Effort normal and breath sounds normal. No stridor. No respiratory distress. He has no decreased breath sounds.  He has no wheezes. He has no rhonchi. He has no rales. He exhibits no tenderness. Abdominal: Soft. Normal aorta and bowel sounds are normal. He exhibits no distension, no abdominal bruit, no pulsatile midline mass and no mass. There is no hepatosplenomegaly. There is no tenderness. There is no rebound, no guarding and no CVA tenderness. No hernia. Musculoskeletal: Normal range of motion. Neurological: He is alert and oriented to person, place, and time. He has normal reflexes. He displays no atrophy and no tremor. No cranial nerve deficit or sensory deficit. He exhibits normal muscle tone. He displays no seizure activity. Coordination normal. GCS eye subscore is 4. GCS verbal subscore is 5. GCS motor subscore is 6. Reflex Scores:       Patellar reflexes are 2+ on the right side and 2+ on the left side. Skin: Skin is warm. No rash noted. He is not diaphoretic. No erythema. Nursing note and vitals reviewed. MDM  Number of Diagnoses or Management Options  ST elevation myocardial infarction (STEMI) of anterior [facility]):   Diagnosis management comments: DDx: STEMI, aortic dissection, chest wall strain    Significant history of coronary disease presents with CP when cutting grass. Was pre-alerted for STEMI. In the ED EKG confirmed inferior MI. Dr. Clari Harrison involved and assisted with premedication. Stable at time of transport.         Amount and/or Complexity of Data Reviewed  Clinical lab tests: ordered and reviewed  Tests in the radiology section of CPT®: reviewed and ordered  Tests in the medicine section of CPT®: ordered and reviewed  Review and summarize past medical records: yes  Independent visualization of images, tracings, or specimens: yes    Risk of Complications, Morbidity, and/or Mortality  Presenting problems: high  Diagnostic procedures: high  Management options: high    Patient Progress  Patient progress: other (comment) (critical)    ED Course     Procedures     EKG interpretation: (Preliminary) 1341  Rhythm: sinus bradycardia; and irregular. Rate (approx.): 58; Axis: normal; OH interval: normal; QRS interval: prolonged; ST/T wave: elevated ; Other findings: STEMI. This note is prepared by Melanie Ibarra acting as Scribe for Iqra Marie MD    Devon Golden JRSvetlana  1955    Time EMS pre-alert: 3884    Time STEMI called: 5931    Time arrived on Unit: 1339, No att. providers found in room    Time EKG completed: 1341    Time EKG read by provider: 1342    Time cardiology/cath lab paged: [phone]    Time cardiologist returned call: [phone]    Time Cath Lab returned call:  1336    Time Cardiologist arrived on Unit: 1400    Time Cath Lab arrived on Unit: 1342    CONSULT NOTE:   1:49 PM  Iqra Marie MD spoke with Dr. Vandana Heart,  Specialty: Cardiology  Discussed pt's hx, disposition, and available diagnostic and imaging results. Reviewed care plans. Consultant agrees with plans as outlined. Administer 180mg Brilinta alongside a nitro drip and morphine. Written by Melanie Ibarra, ED Scribe, as dictated by Iqra Marie MD.    CRITICAL CARE NOTE :    3:22 PM    IMPENDING DETERIORATION -Airway, Respiratory and Cardiovascular    ASSOCIATED RISK FACTORS - Shock, Hypoxia, Dysrhythmia and Vascular Compromise    MANAGEMENT- Bedside Assessment and Supervision of Care    INTERPRETATION -  ECG and Blood Pressure    INTERVENTIONS - Metobolic interventions and anticoagulation,emergent cath lab    CASE REVIEW - Medical Sub-Specialist, Nursing and Family    TREATMENT RESPONSE -Stable    PERFORMED BY - Self    NOTES   :    I have spent 30 minutes of critical care time involved in lab review, consultations with specialist, family decision- making, bedside attention and documentation. During this entire length of time I was immediately available to the patient .     Iqra Marie MD    LABORATORY TESTS:  Recent Results (from the past 12 hour(s))   CBC WITH AUTOMATED DIFF    Collection Time: 04/23/17  1:40 PM Result Value Ref Range    WBC 10.4 4.1 - 11.1 K/uL    RBC 5.35 4.10 - 5.70 M/uL    HGB 16.6 12.1 - 17.0 g/dL    HCT 49.3 36.6 - 50.3 %    MCV 92.1 80.0 - 99.0 FL    MCH 31.0 26.0 - 34.0 PG    MCHC 33.7 30.0 - 36.5 g/dL    RDW 13.2 11.5 - 14.5 %    PLATELET 106 035 - 163 K/uL    NEUTROPHILS 69 32 - 75 %    LYMPHOCYTES 22 12 - 49 %    MONOCYTES 6 5 - 13 %    EOSINOPHILS 3 0 - 7 %    BASOPHILS 0 0 - 1 %    ABS. NEUTROPHILS 7.2 1.8 - 8.0 K/UL    ABS. LYMPHOCYTES 2.3 0.8 - 3.5 K/UL    ABS. MONOCYTES 0.6 0.0 - 1.0 K/UL    ABS. EOSINOPHILS 0.3 0.0 - 0.4 K/UL    ABS. BASOPHILS 0.0 0.0 - 0.1 K/UL   METABOLIC PANEL, COMPREHENSIVE    Collection Time: 04/23/17  1:40 PM   Result Value Ref Range    Sodium 143 136 - 145 mmol/L    Potassium 4.4 3.5 - 5.1 mmol/L    Chloride 106 97 - 108 mmol/L    CO2 28 21 - 32 mmol/L    Anion gap 9 5 - 15 mmol/L    Glucose 114 (H) 65 - 100 mg/dL    BUN 16 6 - 20 MG/DL    Creatinine 1.31 (H) 0.70 - 1.30 MG/DL    BUN/Creatinine ratio 12 12 - 20      GFR est AA >60 >60 ml/min/1.73m2    GFR est non-AA 55 (L) >60 ml/min/1.73m2    Calcium 9.0 8.5 - 10.1 MG/DL    Bilirubin, total 0.4 0.2 - 1.0 MG/DL    ALT (SGPT) 17 12 - 78 U/L    AST (SGOT) 10 (L) 15 - 37 U/L    Alk.  phosphatase 75 45 - 117 U/L    Protein, total 6.5 6.4 - 8.2 g/dL    Albumin 3.2 (L) 3.5 - 5.0 g/dL    Globulin 3.3 2.0 - 4.0 g/dL    A-G Ratio 1.0 (L) 1.1 - 2.2     PTT    Collection Time: 04/23/17  1:40 PM   Result Value Ref Range    aPTT 29.1 22.1 - 32.5 sec    aPTT, therapeutic range     58.0 - 77.0 SECS   PROTHROMBIN TIME + INR    Collection Time: 04/23/17  1:40 PM   Result Value Ref Range    INR 1.0 0.9 - 1.1      Prothrombin time 10.5 9.0 - 11.1 sec   TROPONIN I    Collection Time: 04/23/17  1:40 PM   Result Value Ref Range    Troponin-I, Qt. <0.04 <0.05 ng/mL   EKG, 12 LEAD, INITIAL    Collection Time: 04/23/17  1:41 PM   Result Value Ref Range    Ventricular Rate 58 BPM    Atrial Rate 58 BPM    P-R Interval 174 ms    QRS Duration 96 ms    Q-T Interval 444 ms    QTC Calculation (Bezet) 435 ms    Calculated P Axis 59 degrees    Calculated R Axis -15 degrees    Calculated T Axis 82 degrees    Diagnosis       Sinus bradycardia with sinus arrhythmia  ST elevation, consider inferior injury or acute infarct  ** ** ACUTE MI / STEMI ** **  Consider right ventricular involvement in acute inferior infarct  Abnormal ECG  When compared with ECG of 11-JUN-2010 03:16,  Incomplete right bundle branch block is no longer present  Borderline criteria for Anterior infarct are no longer present       IMAGING RESULTS:  INDICATION: Chest pain      EXAM: Chest single view.     COMPARISON: 6/2/2010.     FINDINGS: A single frontal view of the chest at 1425 hours shows incomplete  inspiratory effort with mild interstitial prominence but no focal infiltrate or  gerry CHF pattern. . The heart, mediastinum and pulmonary vasculature are stable  . The bony thorax is unremarkable for age. .     IMPRESSION  IMPRESSION:  Prominence of interstitial markings likely related to an incomplete inspiratory  effort. No focal infiltrate or gerry CHF pattern.     MEDICATIONS GIVEN:  Medications   sodium chloride (NS) flush 5-10 mL (not administered)   sodium chloride (NS) flush 5-10 mL (not administered)   0.9% sodium chloride infusion (125 mL/hr IntraVENous New Bag 4/23/17 1407)   bivalirudin (ANGIOMAX) 250 mg injection (not administered)   0.9% sodium chloride (MBP/ADV) 0.9 % infusion (not administered)   ADDaptor (not administered)   bivalirudin (ANGIOMAX) 250 mg in 0.9% sodium chloride (MBP/ADV) 50 mL (0.5 mg/kg/hr × 81.6 kg IntraVENous New Bag 4/23/17 1459)   morphine injection 2 mg (2 mg IntraVENous Given 4/23/17 1401)   heparin (porcine) injection 5,000 Units (5,000 Units IntraVENous Given 4/23/17 1346)   ondansetron (ZOFRAN) injection 4 mg (4 mg IntraVENous Given 4/23/17 1347)   ticagrelor (BRILINTA) tablet 180 mg (180 mg Oral Given 4/23/17 1402)   heparinized saline 2 units/mL infusion 1,000 Units (1,000 Units Irrigation New Bag 4/23/17 1438)   heparinized saline 2 units/mL infusion 1,000 Units (1,000 Units Irrigation New Bag 4/23/17 1438)   heparinized saline 2 units/mL infusion 1,000 Units (1,000 Units Irrigation New Bag 4/23/17 1438)   heparin (porcine) 1,000 unit/mL injection 2,500 Units (2,500 Units IntraVENous Given by Provider 4/23/17 1430)   verapamil (ISOPTIN) 2.5 mg/mL injection 2.5 mg (2.5 mg IntraarTERial Given by Provider 4/23/17 1430)   atropine injection 1 mg (1 mg IntraVENous Given 4/23/17 1443)     IMPRESSION:  1. ST elevation myocardial infarction (STEMI) of anterior wall (HCC)      PLAN:  1. Admit to Cardiology, Dr. Sharri Murillo    ADMIT NOTE:    2:07 PM  Patient is being admitted to the hospital by Dr. Greg Nash. The results of their tests and reasons for their admission have been discussed with the patient and/or available family. They convey agreement and understanding for the need to be admitted and for the admission diagnosis. This note is prepared by Nasir Mckeon acting as Scribe for Patrick Paz MD.    Patrick Paz MD: This scribe's documentation has been prepared under my direction and personally reviewed by me in its entirety. I confirm that the note above accurately reflects all work, treatment procedures and medical decision makings performed by me.

## 2017-04-24 LAB
ANION GAP BLD CALC-SCNC: 8 MMOL/L (ref 5–15)
ATRIAL RATE: 58 BPM
ATRIAL RATE: 88 BPM
BASOPHILS # BLD AUTO: 0 K/UL (ref 0–0.1)
BASOPHILS # BLD: 0 % (ref 0–1)
BUN SERPL-MCNC: 17 MG/DL (ref 6–20)
BUN/CREAT SERPL: 16 (ref 12–20)
CALCIUM SERPL-MCNC: 8.1 MG/DL (ref 8.5–10.1)
CALCULATED P AXIS, ECG09: 59 DEGREES
CALCULATED P AXIS, ECG09: 73 DEGREES
CALCULATED R AXIS, ECG10: -15 DEGREES
CALCULATED R AXIS, ECG10: -70 DEGREES
CALCULATED T AXIS, ECG11: 67 DEGREES
CALCULATED T AXIS, ECG11: 82 DEGREES
CHLORIDE SERPL-SCNC: 108 MMOL/L (ref 97–108)
CHOLEST SERPL-MCNC: 174 MG/DL
CK SERPL-CCNC: 546 U/L (ref 39–308)
CO2 SERPL-SCNC: 24 MMOL/L (ref 21–32)
CREAT SERPL-MCNC: 1.06 MG/DL (ref 0.7–1.3)
DIAGNOSIS, 93000: NORMAL
DIAGNOSIS, 93000: NORMAL
EOSINOPHIL # BLD: 0.2 K/UL (ref 0–0.4)
EOSINOPHIL NFR BLD: 2 % (ref 0–7)
ERYTHROCYTE [DISTWIDTH] IN BLOOD BY AUTOMATED COUNT: 13.4 % (ref 11.5–14.5)
GLUCOSE SERPL-MCNC: 93 MG/DL (ref 65–100)
HCT VFR BLD AUTO: 46.5 % (ref 36.6–50.3)
HDLC SERPL-MCNC: 38 MG/DL
HDLC SERPL: 4.6 {RATIO} (ref 0–5)
HGB BLD-MCNC: 15.3 G/DL (ref 12.1–17)
LDLC SERPL CALC-MCNC: 112.4 MG/DL (ref 0–100)
LIPID PROFILE,FLP: ABNORMAL
LYMPHOCYTES # BLD AUTO: 23 % (ref 12–49)
LYMPHOCYTES # BLD: 2.4 K/UL (ref 0.8–3.5)
MCH RBC QN AUTO: 30.2 PG (ref 26–34)
MCHC RBC AUTO-ENTMCNC: 32.9 G/DL (ref 30–36.5)
MCV RBC AUTO: 91.9 FL (ref 80–99)
MONOCYTES # BLD: 0.7 K/UL (ref 0–1)
MONOCYTES NFR BLD AUTO: 6 % (ref 5–13)
NEUTS SEG # BLD: 7.4 K/UL (ref 1.8–8)
NEUTS SEG NFR BLD AUTO: 69 % (ref 32–75)
P-R INTERVAL, ECG05: 174 MS
P-R INTERVAL, ECG05: 184 MS
PLATELET # BLD AUTO: 187 K/UL (ref 150–400)
POTASSIUM SERPL-SCNC: 4.1 MMOL/L (ref 3.5–5.1)
Q-T INTERVAL, ECG07: 376 MS
Q-T INTERVAL, ECG07: 444 MS
QRS DURATION, ECG06: 90 MS
QRS DURATION, ECG06: 96 MS
QTC CALCULATION (BEZET), ECG08: 435 MS
QTC CALCULATION (BEZET), ECG08: 454 MS
RBC # BLD AUTO: 5.06 M/UL (ref 4.1–5.7)
SODIUM SERPL-SCNC: 140 MMOL/L (ref 136–145)
TRIGL SERPL-MCNC: 118 MG/DL (ref ?–150)
VENTRICULAR RATE, ECG03: 58 BPM
VENTRICULAR RATE, ECG03: 88 BPM
VLDLC SERPL CALC-MCNC: 23.6 MG/DL
WBC # BLD AUTO: 10.7 K/UL (ref 4.1–11.1)

## 2017-04-24 PROCEDURE — 82550 ASSAY OF CK (CPK): CPT | Performed by: INTERNAL MEDICINE

## 2017-04-24 PROCEDURE — 93306 TTE W/DOPPLER COMPLETE: CPT

## 2017-04-24 PROCEDURE — 80048 BASIC METABOLIC PNL TOTAL CA: CPT | Performed by: INTERNAL MEDICINE

## 2017-04-24 PROCEDURE — 80061 LIPID PANEL: CPT | Performed by: INTERNAL MEDICINE

## 2017-04-24 PROCEDURE — 36415 COLL VENOUS BLD VENIPUNCTURE: CPT | Performed by: INTERNAL MEDICINE

## 2017-04-24 PROCEDURE — 85025 COMPLETE CBC W/AUTO DIFF WBC: CPT | Performed by: INTERNAL MEDICINE

## 2017-04-24 PROCEDURE — 65660000000 HC RM CCU STEPDOWN

## 2017-04-24 PROCEDURE — 74011250637 HC RX REV CODE- 250/637: Performed by: INTERNAL MEDICINE

## 2017-04-24 PROCEDURE — 74011250636 HC RX REV CODE- 250/636: Performed by: INTERNAL MEDICINE

## 2017-04-24 RX ORDER — ISOSORBIDE MONONITRATE 30 MG/1
30 TABLET, EXTENDED RELEASE ORAL DAILY
Status: DISCONTINUED | OUTPATIENT
Start: 2017-04-25 | End: 2017-04-24

## 2017-04-24 RX ORDER — TAMSULOSIN HYDROCHLORIDE 0.4 MG/1
0.4 CAPSULE ORAL DAILY
Status: DISCONTINUED | OUTPATIENT
Start: 2017-04-25 | End: 2017-04-24

## 2017-04-24 RX ORDER — ALFUZOSIN HYDROCHLORIDE 10 MG/1
10 TABLET, EXTENDED RELEASE ORAL DAILY
Status: DISCONTINUED | OUTPATIENT
Start: 2017-04-25 | End: 2017-04-24 | Stop reason: CLARIF

## 2017-04-24 RX ORDER — ALFUZOSIN HYDROCHLORIDE 10 MG/1
10 TABLET, EXTENDED RELEASE ORAL DAILY
Status: DISCONTINUED | OUTPATIENT
Start: 2017-04-25 | End: 2017-04-26 | Stop reason: HOSPADM

## 2017-04-24 RX ORDER — ISOSORBIDE MONONITRATE 30 MG/1
30 TABLET, EXTENDED RELEASE ORAL DAILY
Status: DISCONTINUED | OUTPATIENT
Start: 2017-04-24 | End: 2017-04-26 | Stop reason: HOSPADM

## 2017-04-24 RX ADMIN — Medication 10 ML: at 14:17

## 2017-04-24 RX ADMIN — Medication 10 ML: at 04:25

## 2017-04-24 RX ADMIN — METOPROLOL TARTRATE 12.5 MG: 25 TABLET ORAL at 21:01

## 2017-04-24 RX ADMIN — MORPHINE SULFATE 2 MG: 10 INJECTION INTRAMUSCULAR; INTRAVENOUS; SUBCUTANEOUS at 09:38

## 2017-04-24 RX ADMIN — Medication 10 ML: at 21:10

## 2017-04-24 RX ADMIN — MORPHINE SULFATE 2 MG: 10 INJECTION INTRAMUSCULAR; INTRAVENOUS; SUBCUTANEOUS at 04:24

## 2017-04-24 RX ADMIN — LISINOPRIL 2.5 MG: 5 TABLET ORAL at 08:58

## 2017-04-24 RX ADMIN — Medication 10 ML: at 04:24

## 2017-04-24 RX ADMIN — ISOSORBIDE MONONITRATE 30 MG: 30 TABLET, EXTENDED RELEASE ORAL at 14:16

## 2017-04-24 RX ADMIN — ASPIRIN 81 MG: 81 TABLET, CHEWABLE ORAL at 08:58

## 2017-04-24 RX ADMIN — ATORVASTATIN CALCIUM 40 MG: 40 TABLET, FILM COATED ORAL at 08:58

## 2017-04-24 RX ADMIN — METOPROLOL TARTRATE 12.5 MG: 25 TABLET ORAL at 08:58

## 2017-04-24 RX ADMIN — MORPHINE SULFATE 2 MG: 10 INJECTION INTRAMUSCULAR; INTRAVENOUS; SUBCUTANEOUS at 21:06

## 2017-04-24 NOTE — CARDIO/PULMONARY
C/P Rehab Note:    Chart Reviewed for Cardiac Consult. Admitting diagnosis of STEMI,(4/23/17), Status post successful primary PCI of the right coronary artery. LV EF by echo yesterday, results pending. PMH significant for:  -Hyperlipidemia  -CAD (PCI)  -Noncompliance with medical treatment    Pt is a former smoker. Met with pt who was sitting up in bed. Introduced myself and reviewed role of Cardiac Rehab Nurse. Patient given printed teaching materials on MI and the cardiac diet. Instruction given on symptom identification of MI and the importance of prompt treatment. Discussed checking weight every am and calling MD if weight is up 2-3 lbs in a day or 5 lbs in a week (or as directed by the physician). Also discussed the cardiac diet (low NA/fat/chol). Reviewed progressive return to activity as tolerated with frequent rest periods as needed, taking medications as prescribed,and the importance of follow up visits with physician. Printed material given and discussed re: heart healthy habits, the cardiac diet, medication management, what to expect following coronary angioplasty, and post cardiac catheterization instructions. Discussed post catheterization restrictions, including:no manipulating the wrist for 24 hours, no lifting for 7 days, no soaking the wrist for 3 days . Also discussed what to do if bleeding or bruising at the cath insertion site is observed. Reviewed the cardiac diet (low NA/fat/CHOL), the importance of medication compliance. Reviewed indications, dosing instructions, medication interactions. and side effects of Brilinta. Pt to monitor for any unusual signs & symptoms and when to call the doctor. RN came into the room to administer medications. Pt without questions and demonstrated understanding.

## 2017-04-24 NOTE — PROGRESS NOTES
[address] [place] [address]  [phone]      Cardiology Progress Note      4/24/2017 9:00 AM    Admit Date: 4/23/2017    Admit Diagnosis:   Acute ST elevation myocardial infarction (STEMI) involving right coronary artery [facility])    Subjective:     Lilliana Licea has mild CP with exertion, and continues with chronic back pain. S/P STEMI with PCI/ES to RCA.   Plan intervention tomorrow on LAD bifurcation    Visit Vitals    /74 (BP 1 Location: Left arm, BP Patient Position: At rest)    Pulse (!) 49    Temp 98.4 °F (36.9 °C)    Resp 18    Ht 5' 10\" (1.778 m)    Wt 81.1 kg (178 lb 12.7 oz)    SpO2 92%    BMI 25.65 kg/m2       Current Facility-Administered Medications   Medication Dose Route Frequency    sodium chloride (NS) flush 5-10 mL  5-10 mL IntraVENous Q8H    sodium chloride (NS) flush 5-10 mL  5-10 mL IntraVENous PRN    0.9% sodium chloride infusion  125 mL/hr IntraVENous CONTINUOUS    bivalirudin (ANGIOMAX) 250 mg injection        0.9% sodium chloride (MBP/ADV) 0.9 % infusion        ADDaptor        bivalirudin (ANGIOMAX) 250 mg in 0.9% sodium chloride (MBP/ADV) 50 mL  0.5 mg/kg/hr IntraVENous CONTINUOUS    atorvastatin (LIPITOR) tablet 40 mg  40 mg Oral DAILY    sodium chloride (NS) flush 5-10 mL  5-10 mL IntraVENous Q8H    sodium chloride (NS) flush 5-10 mL  5-10 mL IntraVENous PRN    lisinopril (PRINIVIL, ZESTRIL) tablet 2.5 mg  2.5 mg Oral DAILY    metoprolol tartrate (LOPRESSOR) tablet 12.5 mg  12.5 mg Oral Q12H    aspirin chewable tablet 81 mg  81 mg Oral DAILY    morphine injection 2 mg  2 mg IntraVENous Q2H PRN       Objective:      Physical Exam:  General Appearance:  WNWD  male in no acute distress  Chest:   Clear  Cardiovascular:  Regular rate and rhythm, no murmur.   Abdomen:   Soft, non-tender, bowel sounds are active.   Extremities: no peripheral edema; right radial site D/I, no hematoma  Skin:  Warm and dry.     Data Review: Recent Labs      04/24/17   0436  04/23/17   1340   WBC  10.7  10.4   HGB  15.3  16.6   HCT  46.5  49.3   PLT  187  198     Recent Labs      04/24/17   0436  04/23/17   1340   NA  140  143   K  4.1  4.4   CL  108  106   CO2  24  28   GLU  93  114*   BUN  17  16   CREA  1.06  1.31*   CA  8.1*  9.0   ALB   --   3.2*   TBILI   --   0.4   SGOT   --   10*   ALT   --   17   INR   --   1.0       Recent Labs      04/24/17   0436  04/23/17   1340   TROIQ   --   <0.04   CPK  546*   --        Results for Arlet Wisdom (MRN [id number]) as of 4/24/2017 10:57   Ref. Range 4/24/2017 04:36   Cholesterol, total Latest Ref Range: <200 MG/   HDL Cholesterol Latest Units: MG/DL 38   CHOL/HDL Ratio Latest Ref Range: 0 - 5.0   4.6   LDL, calculated Latest Ref Range: 0 - 100 MG/.4 (H)           Intake/Output Summary (Last 24 hours) at 04/24/17 1049  Last data filed at 04/24/17 0500   Gross per 24 hour   Intake          2445.42 ml   Output              650 ml   Net          1795.42 ml        Telemetry: SR-SB      Assessment:     Principal Problem:    ST elevation (STEMI) myocardial infarction involving right coronary artery [facility] (4/23/2017)    Active Problems:    Tobacco use disorder (6/2/2010)      Coronary artery disease (6/11/2010)      Hyperlipidemia with target low density lipoprotein (LDL) cholesterol less than 70 mg/dL (6/11/2010)      S/P PTCA (percutaneous transluminal coronary angioplasty) (6/11/2010)      Acute ST elevation myocardial infarction (STEMI) involving right coronary artery ([facility] (4/23/2017)      H/O noncompliance with medical treatment, presenting hazards to health (4/23/2017)        Plan:     STEMI:  S/p PCI/VINCENZO to RCA. Started on ASA 81mg daily, Brilinta 90mg BID, metoprolol and ACE. Continue on statin. Idenitifed significant area of concern on cath at LAD bifurcation - patient continues with mild chest discomfort when ambulating  Plan for further intervention on this area tomorrow.       Tob abuse:  Counseled patient on cessation, Cardiac rehab will provide written material    Hyperlipidemia:  Continue on statin. LDL not at goal 112.   Home statin is Crestor 20mg, plan to increase at discharge to 40mg

## 2017-04-25 LAB
ATRIAL RATE: 59 BPM
CALCULATED P AXIS, ECG09: 66 DEGREES
CALCULATED R AXIS, ECG10: -60 DEGREES
CALCULATED T AXIS, ECG11: -39 DEGREES
DIAGNOSIS, 93000: NORMAL
P-R INTERVAL, ECG05: 186 MS
Q-T INTERVAL, ECG07: 440 MS
QRS DURATION, ECG06: 94 MS
QTC CALCULATION (BEZET), ECG08: 435 MS
VENTRICULAR RATE, ECG03: 59 BPM

## 2017-04-25 PROCEDURE — 77030030195 HC CATH ANGI DX PRF4 MRTM -A

## 2017-04-25 PROCEDURE — 77030019697 HC SYR ANGI INFL MRTM -B

## 2017-04-25 PROCEDURE — 77030019569 HC BND COMPR RAD TERU -B

## 2017-04-25 PROCEDURE — B2111ZZ FLUOROSCOPY OF MULTIPLE CORONARY ARTERIES USING LOW OSMOLAR CONTRAST: ICD-10-PCS | Performed by: INTERNAL MEDICINE

## 2017-04-25 PROCEDURE — 93005 ELECTROCARDIOGRAM TRACING: CPT

## 2017-04-25 PROCEDURE — 77030010221 HC SPLNT WR POS TELE -B

## 2017-04-25 PROCEDURE — 74011250637 HC RX REV CODE- 250/637: Performed by: INTERNAL MEDICINE

## 2017-04-25 PROCEDURE — 65660000000 HC RM CCU STEPDOWN

## 2017-04-25 PROCEDURE — 74011000258 HC RX REV CODE- 258: Performed by: INTERNAL MEDICINE

## 2017-04-25 PROCEDURE — C1874 STENT, COATED/COV W/DEL SYS: HCPCS

## 2017-04-25 PROCEDURE — C1887 CATHETER, GUIDING: HCPCS

## 2017-04-25 PROCEDURE — 4A023N7 MEASUREMENT OF CARDIAC SAMPLING AND PRESSURE, LEFT HEART, PERCUTANEOUS APPROACH: ICD-10-PCS | Performed by: INTERNAL MEDICINE

## 2017-04-25 PROCEDURE — C1725 CATH, TRANSLUMIN NON-LASER: HCPCS

## 2017-04-25 PROCEDURE — C1894 INTRO/SHEATH, NON-LASER: HCPCS

## 2017-04-25 PROCEDURE — 77030008543 HC TBNG MON PRSS MRTM -A

## 2017-04-25 PROCEDURE — 74011250636 HC RX REV CODE- 250/636: Performed by: EMERGENCY MEDICINE

## 2017-04-25 PROCEDURE — 74011636320 HC RX REV CODE- 636/320: Performed by: INTERNAL MEDICINE

## 2017-04-25 PROCEDURE — 77030019698 HC SYR ANGI MDLON MRTM -A

## 2017-04-25 PROCEDURE — C1769 GUIDE WIRE: HCPCS

## 2017-04-25 PROCEDURE — 74011250636 HC RX REV CODE- 250/636

## 2017-04-25 PROCEDURE — 74011250637 HC RX REV CODE- 250/637

## 2017-04-25 PROCEDURE — 92928 PRQ TCAT PLMT NTRAC ST 1 LES: CPT

## 2017-04-25 PROCEDURE — 74011250636 HC RX REV CODE- 250/636: Performed by: INTERNAL MEDICINE

## 2017-04-25 PROCEDURE — 74011636320 HC RX REV CODE- 636/320

## 2017-04-25 PROCEDURE — 74011000250 HC RX REV CODE- 250

## 2017-04-25 PROCEDURE — 027034Z DILATION OF CORONARY ARTERY, ONE ARTERY WITH DRUG-ELUTING INTRALUMINAL DEVICE, PERCUTANEOUS APPROACH: ICD-10-PCS | Performed by: INTERNAL MEDICINE

## 2017-04-25 RX ORDER — FENTANYL CITRATE 50 UG/ML
25-50 INJECTION, SOLUTION INTRAMUSCULAR; INTRAVENOUS
Status: DISCONTINUED | OUTPATIENT
Start: 2017-04-25 | End: 2017-04-25

## 2017-04-25 RX ORDER — HEPARIN SODIUM 200 [USP'U]/100ML
500 INJECTION, SOLUTION INTRAVENOUS ONCE
Status: COMPLETED | OUTPATIENT
Start: 2017-04-25 | End: 2017-04-25

## 2017-04-25 RX ORDER — BIVALIRUDIN 250 MG/5ML
INJECTION, POWDER, LYOPHILIZED, FOR SOLUTION INTRAVENOUS
Status: DISCONTINUED
Start: 2017-04-25 | End: 2017-04-26 | Stop reason: HOSPADM

## 2017-04-25 RX ORDER — SODIUM CHLORIDE 9 MG/ML
INJECTION, SOLUTION INTRAVENOUS
Status: DISCONTINUED
Start: 2017-04-25 | End: 2017-04-26 | Stop reason: HOSPADM

## 2017-04-25 RX ORDER — SODIUM CHLORIDE 900 MG/100ML
INJECTION INTRAVENOUS
Status: DISCONTINUED
Start: 2017-04-25 | End: 2017-04-26 | Stop reason: HOSPADM

## 2017-04-25 RX ORDER — HEPARIN SODIUM 1000 [USP'U]/ML
2500 INJECTION, SOLUTION INTRAVENOUS; SUBCUTANEOUS ONCE
Status: COMPLETED | OUTPATIENT
Start: 2017-04-25 | End: 2017-04-25

## 2017-04-25 RX ORDER — FENTANYL CITRATE 50 UG/ML
INJECTION, SOLUTION INTRAMUSCULAR; INTRAVENOUS
Status: COMPLETED
Start: 2017-04-25 | End: 2017-04-25

## 2017-04-25 RX ORDER — LIDOCAINE HYDROCHLORIDE 10 MG/ML
INJECTION, SOLUTION EPIDURAL; INFILTRATION; INTRACAUDAL; PERINEURAL
Status: COMPLETED
Start: 2017-04-25 | End: 2017-04-25

## 2017-04-25 RX ORDER — MIDAZOLAM HYDROCHLORIDE 1 MG/ML
.5-2 INJECTION, SOLUTION INTRAMUSCULAR; INTRAVENOUS
Status: DISCONTINUED | OUTPATIENT
Start: 2017-04-25 | End: 2017-04-25

## 2017-04-25 RX ORDER — HEPARIN SODIUM 200 [USP'U]/100ML
INJECTION, SOLUTION INTRAVENOUS
Status: COMPLETED
Start: 2017-04-25 | End: 2017-04-25

## 2017-04-25 RX ORDER — MIDAZOLAM HYDROCHLORIDE 1 MG/ML
INJECTION, SOLUTION INTRAMUSCULAR; INTRAVENOUS
Status: COMPLETED
Start: 2017-04-25 | End: 2017-04-25

## 2017-04-25 RX ORDER — VERAPAMIL HYDROCHLORIDE 2.5 MG/ML
2.5 INJECTION, SOLUTION INTRAVENOUS ONCE
Status: COMPLETED | OUTPATIENT
Start: 2017-04-25 | End: 2017-04-25

## 2017-04-25 RX ORDER — HEPARIN SODIUM 1000 [USP'U]/ML
INJECTION, SOLUTION INTRAVENOUS; SUBCUTANEOUS
Status: COMPLETED
Start: 2017-04-25 | End: 2017-04-25

## 2017-04-25 RX ORDER — LIDOCAINE HYDROCHLORIDE 10 MG/ML
1-30 INJECTION, SOLUTION EPIDURAL; INFILTRATION; INTRACAUDAL; PERINEURAL
Status: DISCONTINUED | OUTPATIENT
Start: 2017-04-25 | End: 2017-04-25

## 2017-04-25 RX ORDER — VERAPAMIL HYDROCHLORIDE 2.5 MG/ML
INJECTION, SOLUTION INTRAVENOUS
Status: COMPLETED
Start: 2017-04-25 | End: 2017-04-25

## 2017-04-25 RX ADMIN — IOPAMIDOL 120 ML: 755 INJECTION, SOLUTION INTRAVENOUS at 14:59

## 2017-04-25 RX ADMIN — METOPROLOL TARTRATE 12.5 MG: 25 TABLET ORAL at 08:26

## 2017-04-25 RX ADMIN — HEPARIN SODIUM 2500 UNITS: 1000 INJECTION, SOLUTION INTRAVENOUS; SUBCUTANEOUS at 14:21

## 2017-04-25 RX ADMIN — HEPARIN SODIUM 1000 UNITS: 200 INJECTION, SOLUTION INTRAVENOUS at 14:03

## 2017-04-25 RX ADMIN — MIDAZOLAM HYDROCHLORIDE 2 MG: 1 INJECTION INTRAMUSCULAR; INTRAVENOUS at 13:55

## 2017-04-25 RX ADMIN — BIVALIRUDIN 1.75 MG/KG/HR: 250 INJECTION, POWDER, LYOPHILIZED, FOR SOLUTION INTRAVENOUS at 13:59

## 2017-04-25 RX ADMIN — MIDAZOLAM HYDROCHLORIDE 1 MG: 1 INJECTION, SOLUTION INTRAMUSCULAR; INTRAVENOUS at 15:13

## 2017-04-25 RX ADMIN — ALFUZOSIN HYDROCHLORIDE 10 MG: 10 TABLET, EXTENDED RELEASE ORAL at 20:59

## 2017-04-25 RX ADMIN — LISINOPRIL 2.5 MG: 5 TABLET ORAL at 08:26

## 2017-04-25 RX ADMIN — VERAPAMIL HYDROCHLORIDE 2.5 MG: 2.5 INJECTION INTRAVENOUS at 14:22

## 2017-04-25 RX ADMIN — FENTANYL CITRATE 25 MCG: 50 INJECTION, SOLUTION INTRAMUSCULAR; INTRAVENOUS at 13:50

## 2017-04-25 RX ADMIN — VERAPAMIL HYDROCHLORIDE 2.5 MG: 2.5 INJECTION, SOLUTION INTRAVENOUS at 14:22

## 2017-04-25 RX ADMIN — MIDAZOLAM HYDROCHLORIDE 2 MG: 1 INJECTION, SOLUTION INTRAMUSCULAR; INTRAVENOUS at 13:55

## 2017-04-25 RX ADMIN — FENTANYL CITRATE 25 MCG: 50 INJECTION, SOLUTION INTRAMUSCULAR; INTRAVENOUS at 14:42

## 2017-04-25 RX ADMIN — FENTANYL CITRATE 25 MCG: 50 INJECTION, SOLUTION INTRAMUSCULAR; INTRAVENOUS at 14:20

## 2017-04-25 RX ADMIN — ASPIRIN 81 MG: 81 TABLET, CHEWABLE ORAL at 08:26

## 2017-04-25 RX ADMIN — ISOSORBIDE MONONITRATE 30 MG: 30 TABLET, EXTENDED RELEASE ORAL at 08:26

## 2017-04-25 RX ADMIN — TICAGRELOR 180 MG: 90 TABLET ORAL at 14:14

## 2017-04-25 RX ADMIN — MIDAZOLAM HYDROCHLORIDE 1 MG: 1 INJECTION, SOLUTION INTRAMUSCULAR; INTRAVENOUS at 14:33

## 2017-04-25 RX ADMIN — IOPAMIDOL 50 ML: 755 INJECTION, SOLUTION INTRAVENOUS at 15:16

## 2017-04-25 RX ADMIN — NITROGLYCERIN 200 MCG: 5 INJECTION, SOLUTION INTRAVENOUS at 14:22

## 2017-04-25 RX ADMIN — Medication 10 ML: at 21:00

## 2017-04-25 RX ADMIN — LIDOCAINE HYDROCHLORIDE 2 ML: 10 INJECTION, SOLUTION EPIDURAL; INFILTRATION; INTRACAUDAL; PERINEURAL at 14:21

## 2017-04-25 RX ADMIN — ATORVASTATIN CALCIUM 40 MG: 40 TABLET, FILM COATED ORAL at 08:26

## 2017-04-25 RX ADMIN — MIDAZOLAM HYDROCHLORIDE 1 MG: 1 INJECTION, SOLUTION INTRAMUSCULAR; INTRAVENOUS at 15:00

## 2017-04-25 RX ADMIN — FENTANYL CITRATE 25 MCG: 50 INJECTION, SOLUTION INTRAMUSCULAR; INTRAVENOUS at 14:49

## 2017-04-25 RX ADMIN — BIVALIRUDIN 0.5 MG/KG/HR: 250 INJECTION, POWDER, LYOPHILIZED, FOR SOLUTION INTRAVENOUS at 15:18

## 2017-04-25 RX ADMIN — Medication 10 ML: at 06:05

## 2017-04-25 RX ADMIN — MORPHINE SULFATE 2 MG: 10 INJECTION INTRAMUSCULAR; INTRAVENOUS; SUBCUTANEOUS at 19:25

## 2017-04-25 RX ADMIN — MIDAZOLAM HYDROCHLORIDE 2 MG: 1 INJECTION, SOLUTION INTRAMUSCULAR; INTRAVENOUS at 14:20

## 2017-04-25 RX ADMIN — SODIUM CHLORIDE 125 ML/HR: 900 INJECTION, SOLUTION INTRAVENOUS at 19:28

## 2017-04-25 RX ADMIN — MIDAZOLAM HYDROCHLORIDE 2 MG: 1 INJECTION INTRAMUSCULAR; INTRAVENOUS at 14:20

## 2017-04-25 NOTE — PROGRESS NOTES
Bedside shift change report given to ABHISHEK Gutierrze (oncoming nurse) by Marcelo Minor (offgoing nurse).  Report included the following information SBAR, Kardex, MAR and Recent Results. ]

## 2017-04-25 NOTE — PROGRESS NOTES
Spiritual Care Partner Volunteer visited patient in [employer] on 4/25/17. Documented by:    Master Borden M.S.   Spiritual Care Department  If need arise please call Jeison-ANABELLA ([phone])

## 2017-04-25 NOTE — CARDIO/PULMONARY
Cardiopulmonary Rehab:      Chart reviewed. Pt is a 57 y.o. male admitted with NSTEMI. PMH includes hyperlipidemia, tobacco abuse. Pt with history of medical non compliance. S/p PCI/VINCENZO to RCA. Further intervention to LAD pending today. Echo yesturday indicated LVEF 55-60%. Pt visited, wife at the bedside. Discussed checking weight every am and calling MD if weight is up 2-3 lbs in a day or 5 lbs in a week (or as directed by the physician). Also discussed the cardiac diet (low NA/fat/chol). Reviewed progressive return to activity as tolerated with frequent rest periods as needed, taking medications as prescribed, the benefits of outpatient cardiac rehab and the importance of follow up visits with physician. Pt declined rehab at this time due to busy schedule. Pt states he will be more compliant with his medications and follow up appointments. Stressed importance of regular exercise and smoking cessation    Pt does not appear interested in smoking cessation at this time. Discussed identifying triggers for smoking and substituting alternative behaviors for smoking. Discussed avoiding second hand smoke and places/circumstances where smoking is common. Also discussed methods and products that help one to quit smoking. Encouraged patient to verbalize concerns/questions. Will follow as appropriate for MI education and support.

## 2017-04-25 NOTE — PROGRESS NOTES
[address] [place] [address]  [phone]      Cardiology Progress Note      4/25/2017 8:30AM    Admit Date: 4/23/2017    Admit Diagnosis:   Acute ST elevation myocardial infarction (STEMI) involving right coronary artery [facility])    Subjective:     Chase Cummings. has no further chest discomfort, SOB. S/P STEMI with PCI/VINCENZO, to proceed with intervention on bifurcation of LAD today.     Visit Vitals    /57 (BP 1 Location: Left arm, BP Patient Position: At rest)    Pulse 64    Temp 99.2 °F (37.3 °C)    Resp 16    Ht 5' 10\" (1.778 m)    Wt 81.1 kg (178 lb 12.7 oz)    SpO2 91%    BMI 25.65 kg/m2       Current Facility-Administered Medications   Medication Dose Route Frequency    isosorbide mononitrate ER (IMDUR) tablet 30 mg  30 mg Oral DAILY    alfuzosin SR (UROXATRAL) tablet 10 mg  10 mg Oral DAILY    sodium chloride (NS) flush 5-10 mL  5-10 mL IntraVENous Q8H    sodium chloride (NS) flush 5-10 mL  5-10 mL IntraVENous PRN    0.9% sodium chloride infusion  125 mL/hr IntraVENous CONTINUOUS    bivalirudin (ANGIOMAX) 250 mg injection        0.9% sodium chloride (MBP/ADV) 0.9 % infusion        ADDaptor        bivalirudin (ANGIOMAX) 250 mg in 0.9% sodium chloride (MBP/ADV) 50 mL  0.5 mg/kg/hr IntraVENous CONTINUOUS    atorvastatin (LIPITOR) tablet 40 mg  40 mg Oral DAILY    sodium chloride (NS) flush 5-10 mL  5-10 mL IntraVENous Q8H    sodium chloride (NS) flush 5-10 mL  5-10 mL IntraVENous PRN    lisinopril (PRINIVIL, ZESTRIL) tablet 2.5 mg  2.5 mg Oral DAILY    metoprolol tartrate (LOPRESSOR) tablet 12.5 mg  12.5 mg Oral Q12H    aspirin chewable tablet 81 mg  81 mg Oral DAILY    morphine injection 2 mg  2 mg IntraVENous Q2H PRN       Objective:      Physical Exam:  General Appearance:  WNWD  male in no acute distress  Chest:   Clear  Cardiovascular:  Regular rate and rhythm, no murmur.   Abdomen:   Soft, non-tender, bowel sounds are active.   Extremities: right radial site D/I, no hematoma  Skin:  Warm and dry.     Data Review:   Recent Labs      04/24/17   0436  04/23/17   1340   WBC  10.7  10.4   HGB  15.3  16.6   HCT  46.5  49.3   PLT  187  198     Recent Labs      04/24/17   0436  04/23/17   1340   NA  140  143   K  4.1  4.4   CL  108  106   CO2  24  28   GLU  93  114*   BUN  17  16   CREA  1.06  1.31*   CA  8.1*  9.0   ALB   --   3.2*   TBILI   --   0.4   SGOT   --   10*   ALT   --   17   INR   --   1.0       Recent Labs      04/24/17   0436  04/23/17   1340   TROIQ   --   <0.04   CPK  546*   --          Intake/Output Summary (Last 24 hours) at 04/25/17 1033  Last data filed at 04/24/17 1447   Gross per 24 hour   Intake              480 ml   Output                0 ml   Net              480 ml        Telemetry: SR       Assessment:     Principal Problem:    ST elevation (STEMI) myocardial infarction involving right coronary artery ([facility] (4/23/2017)    Active Problems:    Tobacco use disorder (6/2/2010)      Coronary artery disease (6/11/2010)      Hyperlipidemia with target low density lipoprotein (LDL) cholesterol less than 70 mg/dL (6/11/2010)      S/P PTCA (percutaneous transluminal coronary angioplasty) (6/11/2010)      Acute ST elevation myocardial infarction (STEMI) involving right coronary artery ([facility] (4/23/2017)      H/O noncompliance with medical treatment, presenting hazards to health (4/23/2017)        Plan:     STEMI with hx of CAD:   S/p PCI/VINCENZO to RCA. Continue on ASA 81mg daily, Brilinta 90mg BID, metoprolol, ACE and statin. Intervention on LAD today. Tob abuse:  Counseled patient on cessation, Cardiac rehab will provide written material     Hyperlipidemia:  Continue on statin. LDL not at goal 112.   Home statin is Crestor 20mg, plan to increase at discharge to 40mg

## 2017-04-25 NOTE — PROGRESS NOTES
Bedside and Verbal shift change report given to Maverick Flanagan (oncoming nurse) by Cali Harris (offgoing nurse). Report included the following information SBAR, Kardex, MAR and Recent Results.

## 2017-04-26 VITALS
BODY MASS INDEX: 25.6 KG/M2 | WEIGHT: 178.79 LBS | DIASTOLIC BLOOD PRESSURE: 53 MMHG | RESPIRATION RATE: 18 BRPM | TEMPERATURE: 98.8 F | HEIGHT: 70 IN | SYSTOLIC BLOOD PRESSURE: 97 MMHG | HEART RATE: 68 BPM | OXYGEN SATURATION: 96 %

## 2017-04-26 PROBLEM — Z95.5 S/P CORONARY ARTERY STENT PLACEMENT: Status: ACTIVE | Noted: 2017-04-26

## 2017-04-26 PROCEDURE — 74011250637 HC RX REV CODE- 250/637: Performed by: INTERNAL MEDICINE

## 2017-04-26 RX ORDER — ISOSORBIDE MONONITRATE 30 MG/1
30 TABLET, EXTENDED RELEASE ORAL DAILY
Qty: 30 TAB | Refills: 11 | Status: SHIPPED | OUTPATIENT
Start: 2017-04-26 | End: 2018-06-08 | Stop reason: SDUPTHER

## 2017-04-26 RX ORDER — METOPROLOL TARTRATE 25 MG/1
12.5 TABLET, FILM COATED ORAL EVERY 12 HOURS
Qty: 30 TAB | Refills: 11 | Status: SHIPPED | OUTPATIENT
Start: 2017-04-26 | End: 2018-06-08 | Stop reason: SDUPTHER

## 2017-04-26 RX ORDER — LISINOPRIL 2.5 MG/1
2.5 TABLET ORAL DAILY
Qty: 30 TAB | Refills: 11 | Status: SHIPPED | OUTPATIENT
Start: 2017-04-26 | End: 2018-04-17 | Stop reason: SDUPTHER

## 2017-04-26 RX ORDER — ROSUVASTATIN CALCIUM 20 MG/1
20 TABLET, COATED ORAL
Qty: 30 TAB | Refills: 11 | Status: SHIPPED | OUTPATIENT
Start: 2017-04-26 | End: 2018-04-17 | Stop reason: SDUPTHER

## 2017-04-26 RX ORDER — ASPIRIN 81 MG/1
81 TABLET ORAL DAILY
Qty: 30 TAB | Refills: 11 | Status: SHIPPED | OUTPATIENT
Start: 2017-04-26

## 2017-04-26 RX ADMIN — ASPIRIN 81 MG: 81 TABLET, CHEWABLE ORAL at 08:03

## 2017-04-26 RX ADMIN — TICAGRELOR 90 MG: 90 TABLET ORAL at 02:29

## 2017-04-26 RX ADMIN — Medication 10 ML: at 03:45

## 2017-04-26 RX ADMIN — ISOSORBIDE MONONITRATE 30 MG: 30 TABLET, EXTENDED RELEASE ORAL at 08:03

## 2017-04-26 RX ADMIN — ATORVASTATIN CALCIUM 40 MG: 40 TABLET, FILM COATED ORAL at 08:03

## 2017-04-26 RX ADMIN — LISINOPRIL 2.5 MG: 5 TABLET ORAL at 08:04

## 2017-04-26 RX ADMIN — METOPROLOL TARTRATE 12.5 MG: 25 TABLET ORAL at 08:04

## 2017-04-26 NOTE — PROGRESS NOTES
Bedside shift change report given to Carlos Eduardo Gamino Rn  (oncoming nurse) by Flavia Ramos RN  (offgoing nurse). Report included the following information SBAR, ED Summary, Procedure Summary, MAR and Recent Results.

## 2017-04-26 NOTE — PROGRESS NOTES
Bedside shift change report given to Starr Rivera (oncoming nurse) by Summer (offgoing nurse). Report included the following information SBAR, Kardex, Procedure Summary, Intake/Output, MAR, Recent Results and Cardiac Rhythm NSR.     1955- TR band removed without difficulty. Dressing applied. 2030- Patient ambulated down hallway without difficulty. No complaints of dizziness, shortness of breath, or chest pain.

## 2017-04-26 NOTE — DISCHARGE SUMMARY
[address] [place] [address]  [phone]     Cardiology Discharge Summary     Patient ID:  Gorge Mei  [id number]  57 y.o.  1955    Admit Date: 4/23/2017    Discharge Date: 4/26/2017     Admitting Physician: Hernán Streeter MD     Discharge Physician: Chase Stewart NP    Admission Diagnoses:   Acute ST elevation myocardial infarction (STEMI) involving right coronary artery [facility])    Discharge Diagnoses:   Principal Problem:    ST elevation (STEMI) myocardial infarction involving right coronary artery ([address] (4/23/2017)    Active Problems:    Tobacco use disorder (6/2/2010)      Coronary artery disease (6/11/2010)      Hyperlipidemia with target low density lipoprotein (LDL) cholesterol less than 70 mg/dL (6/11/2010)      S/P PTCA (percutaneous transluminal coronary angioplasty) (6/11/2010)      Acute ST elevation myocardial infarction (STEMI) involving right coronary artery ([address] (4/23/2017)      H/O noncompliance with medical treatment, presenting hazards to health (4/23/2017)        Discharge Condition: Good    Cardiology Procedures this Admission:  Left heart catheterization with PCI  EchoCardiogram    Patient Active Problem List    Diagnosis Date Noted    ST elevation (STEMI) myocardial infarction involving right coronary artery ([address] 04/23/2017    Acute ST elevation myocardial infarction (STEMI) involving right coronary artery ([address] 04/23/2017    H/O noncompliance with medical treatment, presenting hazards to health 04/23/2017    Coronary artery disease 06/11/2010    Hyperlipidemia with target low density lipoprotein (LDL) cholesterol less than 70 mg/dL 06/11/2010    S/P PTCA (percutaneous transluminal coronary angioplasty) 06/11/2010    Unstable angina ([address] 06/02/2010    Tobacco use disorder 06/02/2010      Past Medical History:   Diagnosis Date    CAD (coronary artery disease)     stent 6/10    Hypertension     Other ill-defined conditions     high cholestrol      Social History     Social History    Marital status: UNKNOWN     Spouse name: N/A    Number of children: N/A    Years of education: N/A     Social History Main Topics    Smoking status: Former Smoker     Packs/day: 0.50     Years: 38.00    Smokeless tobacco: None      Comment: trying to quit tapering off    Alcohol use No      Comment: since 01/10, 1 beer    Drug use: Yes     Special: Prescription, OTC    Sexual activity: Yes     Other Topics Concern    None     Social History Narrative     Allergies   Allergen Reactions    Pcn [Penicillins] Swelling      Family History   Problem Relation Age of Onset    Kidney Disease Mother     Cancer Father         Hospital Course: (per Dr. Tomer Zimmer H&P note)  Darrick Zamudio. is a 57 y.o. male with history significant for HTN and CAD presenting via EMS to [address] [place] [address] ED with c/o sudden onset of chest pain x 30 minutes prior to arrival when the pt states he was cutting grass. EMS reports administrating 2 NSL Nitro en route with 1L bolus en route. Pt informs ingesting 2 ASA prior to EMS arrival. Pt reports history of MI x 5-6 years with placement of stents. Did not f/u with Cardiology post procedure. Pt states he is still in pain. Pt specifically denies any nausea, vomiting, fevers, chills, abdominal pain, or SOB. EKG on arrival revealed significant inferior ST elevation with reciprocal changes. Patient was consented for and brought emergently to the cardiac catheterization laboratory for PCI. He received heparin bolus and 180 mg of Brilinta as well as morphine in the emergency room prior to catheterization.     STEMI with hx of CAD:   S/p PCI/VINCENZO to RCA with STEMI. Started on Brilinta 90mg BID for at least 1 year, ASA 81mg daily for life, metoprolol, ACE, Imdur, and statin. 2nd intervention on LAD due to recurrent chest tightness/ BABB after initial PCI of RCA for STEMI, attributable to class III/ unstable angina new after initial PCI.   At discharge no further c/o CP, SOB     Tob abuse:  Counseled patient on cessation, Cardiac rehab will provide written material      Hyperlipidemia:  Continue on statin. LDL not at goal 112. Home statin is Crestor 20mg, may need to increase in future as goal for LDL is 70       Visit Vitals    BP 97/53 (BP 1 Location: Left arm, BP Patient Position: At rest)    Pulse 68    Temp 98.8 °F (37.1 °C)    Resp 18    Ht 5' 10\" (1.778 m)    Wt 81.1 kg (178 lb 12.7 oz)    SpO2 96%    BMI 25.65 kg/m2       Physical Exam  General:  WNWD  male in no acute distress  Abdomen: soft, non-tender. Bowel sounds normal.   Extremities: right radial site D/I, no hematoma, extremities normal, no edema, pulses   Heart: regular rate and rhythm, no murmur, S3/JVD  Lungs: clear to auscultation bilaterally    Labs:   Recent Labs      04/24/17   0436  04/23/17   1340   WBC  10.7  10.4   HGB  15.3  16.6   HCT  46.5  49.3   PLT  187  198     Recent Labs      04/24/17   0436  04/23/17   1340   NA  140  143   K  4.1  4.4   CL  108  106   CO2  24  28   GLU  93  114*   BUN  17  16   CREA  1.06  1.31*   CA  8.1*  9.0   ALB   --   3.2*   TBILI   --   0.4   SGOT   --   10*   ALT   --   17   INR   --   1.0       Recent Labs      04/24/17   0436  04/23/17   1340   TROIQ   --   <0.04   CPK  546*   --        EKG: initial SR with CATINA inferior  Echo: EF 55-60%    Disposition: home      Patient Instructions:   Discharge Medication List as of 4/26/2017 11:16 AM      START taking these medications    Details   aspirin delayed-release 81 mg tablet Take 1 Tab by mouth daily. , Normal, Disp-30 Tab, R-11      lisinopril (PRINIVIL, ZESTRIL) 2.5 mg tablet Take 1 Tab by mouth daily. , Normal, Disp-30 Tab, R-11      metoprolol tartrate (LOPRESSOR) 25 mg tablet Take 0.5 Tabs by mouth every twelve (12) hours. , Normal, Disp-30 Tab, R-11      ticagrelor (BRILINTA) 90 mg tablet Take 1 Tab by mouth every twelve (12) hours every twelve (12) hours. , Normal, Disp-60 Tab, R-11 isosorbide mononitrate ER (IMDUR) 30 mg tablet Take 1 Tab by mouth daily. , Normal, Disp-30 Tab, R-11         CONTINUE these medications which have NOT CHANGED    Details   alfuzosin SR (UROXATRAL) 10 mg SR tablet Take  by mouth daily. , Historical Med      rosuvastatin (CRESTOR) 20 mg tablet Take 1 Tab by mouth nightly., NormalPatient needs appt with Dr. Leda Mcghee before any additional refills are given. Disp-30 Tab, R-0         STOP taking these medications       aspirin (ASPIRIN) 325 mg tablet Comments:   Reason for Stopping:               Referenced discharge instructions provided by nursing for diet and activity.           Signed:  Laya Min NP  4/26/2017  1:26 PM

## 2017-04-26 NOTE — DISCHARGE INSTRUCTIONS
[address]  [phone]        Patient ID:  Kaylynn Perla  [id number]  57 y.o.  1955    Admit Date: 4/23/2017    Discharge Date: 4/26/2017     Admitting Physician: Alberto Yancey MD     Discharge Physician: Sharon Escalante NP    Admission Diagnoses:   Acute ST elevation myocardial infarction (STEMI) involving right coronary artery [facility])    Discharge Diagnoses:   Principal Problem:    ST elevation (STEMI) myocardial infarction involving right coronary artery ([facility] (4/23/2017)    Active Problems:    Tobacco use disorder (6/2/2010)      Coronary artery disease (6/11/2010)      Hyperlipidemia with target low density lipoprotein (LDL) cholesterol less than 70 mg/dL (6/11/2010)      S/P PTCA (percutaneous transluminal coronary angioplasty) (6/11/2010)      Acute ST elevation myocardial infarction (STEMI) involving right coronary artery ([facility] (4/23/2017)      H/O noncompliance with medical treatment, presenting hazards to health (4/23/2017)        Discharge Condition: Good    Cardiology Procedures this Admission:  Left heart catheterization with PCI    Disposition: home    Reference discharge instructions provided by nursing for diet and activity. Signed:  Sharon Escalante NP  4/26/2017  11:13 AM      Radial Cardiac Catheterization/Angiography Discharge Instructions    It is normal to feel tired the first couple days. Take it easy and follow the physicians instructions. CHECK THE CATHETER INSERTION SITE DAILY:    Remove the wrist dressing 24 hours after the procedure. You may shower 24 hours after the procedure. Wash with soap and water and pat dry. Gentle cleaning of the site with soap and water is sufficient, cover with a dry clean dressing or bandage. Do not apply creams or powders to the area. No soaking the wrist for 3 days. Leave the puncture site open to air after 24 hours post-procedure.     CALL THE PHYSICIANS:     If the site becomes red, swollen or feels warm to the touch  If there is bleeding or drainage or if there is unusual pain at the radial site. If there is any minor oozing, you may apply a band-aid and remove after 12 hours. If the bleeding continues, hold pressure with the middle finger against the puncture site and the thumb against the back of the wrist,call 911 to be transported to the hospital.  DO NOT DRIVE YOURSELF, [address]. ACTIVITY:   For the first 24 hours do not manipulate the wrist.  No lifting, pushing or pulling over 3-5 pounds with the affected wrist for 7 daysand no straining the insertion site. Do not life grocery bags or the garbage can, do not run the vacuum  or  for 7 days. Start with short walks as in the hospital and gradually increase as tolerated each day. It is recommended to walk 30 minutes 5-7 days per week. Follow your physicians instructions on activity. Avoid walking outside in extremes of heat or cold. Walk inside when it is cold and windy or hot and humid. Things to keep in mind:  No driving for at least 24 hours, or as designated by your physician. Limit the number of times you go up and down the stairs  Take rests and pace yourself with activity. Be careful and do not strain with bowel movements. MEDICATIONS:    Take all medications as prescribed  Call your physician if you have any questions  Keep an updated list of your medications with you at all times and give a list to your physician and pharmacist    SIGNS AND SYMPTOMS:   Be cautious of symptoms of angina or recurrent symptoms such as chest discomfort, unusual shortness of breath or fatigue. These could be symptoms of restenosis, a new blockage or a heart attack. If your symptoms are relieved with rest it is still recommended that you notify your physician of recurrent chest pain or discomfort.   For CHEST PAIN or symptoms of angina not relieved with rest:  If the discomfort is not relieved with rest, and you have been prescribed Nitroglycerin, take as directed (taken under the tongue, one at a time 5 minutes apart for a total of 3 doses). If the discomfort is not relieved after the 3rd nitroglycerin, call 911. If you have not been prescribed Nitroglycerin  and your chest discomfort is not relieved with rest, call 911. AFTER CARE:   Follow up with your physician as instructed. Follow a heart healthy diet with proper portion control, daily stress management, daily exercise, blood pressure and cholesterol control , and smoking cessation.

## 2017-04-26 NOTE — CARDIO/PULMONARY
Cardiopulmonary Rehab:      Chart reviewed. Pt is a 57 y.o. male admitted with STEMI. PMH includes hyperlipidemia, tobacco abuse. Pt with history of medical non compliance. S/p PCI/VINCENZO to RCA. Intervention to LAD 4/25/17. Echo indicated LVEF 55-60%. Pt visited. Reviewed   printed material given and discussed re: heart healthy habits, the cardiac diet, medication management, what to expect following coronary angioplasty, and post cardiac catheterization instructions. Discussed post catheterization restrictions, including:no manipulating the wrist for 24 hours, no lifting for 7 days, no soaking the wrist for 3 days . Also discussed what to do if bleeding or bruising at the cath insertion site is observed. Reviewed the cardiac diet (low NA/fat/CHOL), the importance of medication compliance. Reviewed indications, dosing instructions and side effects of Medication. Pt to monitor for any unusual signs & symptoms and when to call the doctor. Patient states after seeing MD this morning he may be interested in coming to try outpatient cardiac rehab. He was given an orientation packet and will call us when he gets home to schedule visits. Again reinforced importance of smoking cessation with patient.

## 2017-04-26 NOTE — PROGRESS NOTES
Pt given d/c instructions and given opportunity to ask questions. Pt taken by [facility] to main entrance for d/c home with wife.

## 2017-05-01 ENCOUNTER — PATIENT OUTREACH (OUTPATIENT)
Dept: CARDIOLOGY CLINIC | Age: 62
End: 2017-05-01

## 2017-05-12 ENCOUNTER — OFFICE VISIT (OUTPATIENT)
Dept: CARDIOLOGY CLINIC | Age: 62
End: 2017-05-12

## 2017-05-12 VITALS
HEART RATE: 60 BPM | HEIGHT: 70 IN | RESPIRATION RATE: 18 BRPM | WEIGHT: 174 LBS | OXYGEN SATURATION: 97 % | BODY MASS INDEX: 24.91 KG/M2 | SYSTOLIC BLOOD PRESSURE: 116 MMHG | DIASTOLIC BLOOD PRESSURE: 60 MMHG

## 2017-05-12 DIAGNOSIS — Z98.61 S/P PTCA (PERCUTANEOUS TRANSLUMINAL CORONARY ANGIOPLASTY): Chronic | ICD-10-CM

## 2017-05-12 DIAGNOSIS — I21.02 ACUTE ST ELEVATION MYOCARDIAL INFARCTION (STEMI) INVOLVING LEFT ANTERIOR DESCENDING (LAD) CORONARY ARTERY (HCC): ICD-10-CM

## 2017-05-12 DIAGNOSIS — F17.200 TOBACCO USE DISORDER: Chronic | ICD-10-CM

## 2017-05-12 DIAGNOSIS — I21.3 ST ELEVATION MYOCARDIAL INFARCTION (STEMI), SUBSEQUENT EPISODE OF CARE (HCC): Primary | ICD-10-CM

## 2017-05-12 DIAGNOSIS — Z91.199 H/O NONCOMPLIANCE WITH MEDICAL TREATMENT, PRESENTING HAZARDS TO HEALTH: ICD-10-CM

## 2017-05-12 DIAGNOSIS — E78.5 HYPERLIPIDEMIA WITH TARGET LOW DENSITY LIPOPROTEIN (LDL) CHOLESTEROL LESS THAN 70 MG/DL: Chronic | ICD-10-CM

## 2017-05-12 DIAGNOSIS — I25.10 CORONARY ARTERY DISEASE INVOLVING NATIVE CORONARY ARTERY OF NATIVE HEART WITHOUT ANGINA PECTORIS: Chronic | ICD-10-CM

## 2017-05-12 PROBLEM — I51.9 HEART PROBLEM: Status: ACTIVE | Noted: 2017-05-12

## 2017-05-12 PROBLEM — I21.11 ACUTE ST ELEVATION MYOCARDIAL INFARCTION (STEMI) INVOLVING RIGHT CORONARY ARTERY (HCC): Status: RESOLVED | Noted: 2017-04-23 | Resolved: 2017-05-12

## 2017-05-12 NOTE — PROGRESS NOTES
[address] [address]  [phone]     Subjective:      Shanda Nunn. is a 57 y.o. male is here for routine f/u. The patient denies chest pain/ shortness of breath, orthopnea, PND, LE edema, palpitations, syncope, or presyncope. Patient Active Problem List    Diagnosis Date Noted    Heart problem 05/12/2017    S/P coronary artery stent placement 04/26/2017    Acute ST elevation myocardial infarction (STEMI) involving left anterior descending (LAD) coronary artery ([facility] [address] 04/23/2017    H/O noncompliance with medical treatment, presenting hazards to health 04/23/2017    Coronary artery disease 06/11/2010    Hyperlipidemia with target low density lipoprotein (LDL) cholesterol less than 70 mg/dL 06/11/2010    S/P PTCA (percutaneous transluminal coronary angioplasty) 06/11/2010    Unstable angina ([facility] [address] 06/02/2010    Tobacco use disorder 06/02/2010      Stephanie Mace MD  Past Medical History:   Diagnosis Date    CAD (coronary artery disease)     stent 6/10    Hypertension     Other ill-defined conditions     high cholestrol      Past Surgical History:   Procedure Laterality Date    CARDIAC CATHETERIZATION  6/10/2010         DRUG ELUTING STENT SINGLE VESS  6/10/2010         FRACTIONAL FLOW RESERVE  6/10/2010         HC ANGIOSEAL VIP 6FR  6/10/2010         HX TONSILLECTOMY       Allergies   Allergen Reactions    Pcn [Penicillins] Swelling      Family History   Problem Relation Age of Onset    Kidney Disease Mother     Cancer Father       Social History     Social History    Marital status:      Spouse name: N/A    Number of children: N/A    Years of education: N/A     Occupational History    Not on file.      Social History Main Topics    Smoking status: Former Smoker     Packs/day: 0.50     Years: 38.00    Smokeless tobacco: Not on file      Comment: trying to quit tapering off    Alcohol use No      Comment: since 01/10, 1 beer    Drug use: Yes     Special: Prescription, OTC    Sexual activity: Yes     Other Topics Concern    Not on file     Social History Narrative      Current Outpatient Prescriptions   Medication Sig    aspirin delayed-release 81 mg tablet Take 1 Tab by mouth daily.  lisinopril (PRINIVIL, ZESTRIL) 2.5 mg tablet Take 1 Tab by mouth daily.  metoprolol tartrate (LOPRESSOR) 25 mg tablet Take 0.5 Tabs by mouth every twelve (12) hours.  ticagrelor (BRILINTA) 90 mg tablet Take 1 Tab by mouth every twelve (12) hours every twelve (12) hours.  isosorbide mononitrate ER (IMDUR) 30 mg tablet Take 1 Tab by mouth daily.  rosuvastatin (CRESTOR) 20 mg tablet Take 1 Tab by mouth nightly.  alfuzosin SR (UROXATRAL) 10 mg SR tablet Take  by mouth daily. No current facility-administered medications for this visit. Review of Symptoms:  11 systems reviewed, negative other than as stated in the HPI    Physical ExamPhysical Exam:    Vitals:    05/12/17 1418   BP: 116/60   Pulse: 60   Resp: 18   SpO2: 97%   Weight: 174 lb (78.9 kg)   Height: 5' 10\" (1.778 m)     Body mass index is 24.97 kg/(m^2). General PE   Gen:  NAD  Mental Status - Alert. General Appearance - Not in acute distress. Chest and Lung Exam   Inspection: Accessory muscles - No use of accessory muscles in breathing. Auscultation:   Breath sounds: - Normal.   Cardiovascular   Inspection: Jugular vein - Bilateral - Inspection Normal.   Palpation/Percussion:   Apical Impulse: - Normal.   Auscultation: Rhythm - Regular. Heart Sounds - S1 WNL and S2 WNL. No S3 or S4. Murmurs & Other Heart Sounds: Auscultation of the heart reveals - No Murmurs. Peripheral Vascular   Upper Extremity: Inspection - Bilateral - No Cyanotic nailbeds or Digital clubbing. Lower Extremity:   Palpation: Edema - Bilateral - No edema. Abdomen:   Soft, non-tender, bowel sounds are active.   Neuro: A&O times 3, CN and motor grossly WNL    Labs:   Lab Results   Component Value Date/Time    Cholesterol, total 174 04/24/2017 04:36 AM    Cholesterol, total 132 03/28/2012 03:00 AM    Cholesterol, total 232 06/03/2010 04:45 AM    HDL Cholesterol 38 04/24/2017 04:36 AM    HDL Cholesterol 39 03/28/2012 03:00 AM    HDL Cholesterol 30 06/03/2010 04:45 AM    LDL, calculated 112.4 04/24/2017 04:36 AM    LDL, calculated 61 03/28/2012 03:00 AM    LDL, calculated 151.2 06/03/2010 04:45 AM    Triglyceride 118 04/24/2017 04:36 AM    Triglyceride 159 03/28/2012 03:00 AM    Triglyceride 254 06/03/2010 04:45 AM    CHOL/HDL Ratio 4.6 04/24/2017 04:36 AM    CHOL/HDL Ratio 7.7 06/03/2010 04:45 AM     Lab Results   Component Value Date/Time     04/24/2017 04:36 AM     Lab Results   Component Value Date/Time    Sodium 140 04/24/2017 04:36 AM    Potassium 4.1 04/24/2017 04:36 AM    Chloride 108 04/24/2017 04:36 AM    CO2 24 04/24/2017 04:36 AM    Anion gap 8 04/24/2017 04:36 AM    Glucose 93 04/24/2017 04:36 AM    BUN 17 04/24/2017 04:36 AM    Creatinine 1.06 04/24/2017 04:36 AM    BUN/Creatinine ratio 16 04/24/2017 04:36 AM    GFR est AA >60 04/24/2017 04:36 AM    GFR est non-AA >60 04/24/2017 04:36 AM    Calcium 8.1 04/24/2017 04:36 AM    Bilirubin, total 0.4 04/23/2017 01:40 PM    AST (SGOT) 10 04/23/2017 01:40 PM    Alk. phosphatase 75 04/23/2017 01:40 PM    Protein, total 6.5 04/23/2017 01:40 PM    Albumin 3.2 04/23/2017 01:40 PM    Globulin 3.3 04/23/2017 01:40 PM    A-G Ratio 1.0 04/23/2017 01:40 PM    ALT (SGPT) 17 04/23/2017 01:40 PM       EKG:  NSR      Assessment:     Assessment:      1. ST elevation myocardial infarction (STEMI), subsequent episode of care ([address]    2. Coronary artery disease involving native coronary artery of native heart without angina pectoris    3. Hyperlipidemia with target low density lipoprotein (LDL) cholesterol less than 70 mg/dL    4. Tobacco use disorder    5. S/P PTCA (percutaneous transluminal coronary angioplasty)    6.  H/O noncompliance with medical treatment, presenting hazards to health    7. Acute ST elevation myocardial infarction (STEMI) involving left anterior descending (LAD) coronary artery (HCC)        Orders Placed This Encounter    CK     Standing Status:   Future     Standing Expiration Date:   0/60/6571    METABOLIC PANEL, COMPREHENSIVE     Standing Status:   Future     Standing Expiration Date:   9/12/2017    LIPID PANEL     Standing Status:   Future     Standing Expiration Date:   9/12/2017    AMB POC EKG ROUTINE W/ 12 LEADS, INTER & REP     Order Specific Question:   Reason for Exam:     Answer:   routine        Plan:     CAD:  Doing well with no cardiac symptoms s/p VINCENZO to LAD for STEMI 4/17. Compliant with meds. Smoking:  has stopped smoking- slipped up 2 times by habit. Exercising and eating right. Check lipids before 3 month f/u. BP at goal.    Continue current care and f/u in 6 months.     Bennett Espinosa MD

## 2017-05-12 NOTE — PROGRESS NOTES
Chief Complaint   Patient presents with    Heart Problem     2 wk HF appt. Denied cardiac symptoms.

## 2017-05-15 ENCOUNTER — TELEPHONE (OUTPATIENT)
Dept: CARDIOLOGY CLINIC | Age: 62
End: 2017-05-15

## 2017-05-15 NOTE — TELEPHONE ENCOUNTER
MAILED OUT LAB SLIP TO PT. HE FORGOT AT HIS VISIT. ADVISED TO GET LABS CHECKED BEFORE HIS NEXT VISIT AND TO FAST 8-12 HRS BEFORE.

## 2017-08-12 DIAGNOSIS — F17.200 TOBACCO USE DISORDER: Chronic | ICD-10-CM

## 2017-08-12 DIAGNOSIS — E78.5 HYPERLIPIDEMIA WITH TARGET LOW DENSITY LIPOPROTEIN (LDL) CHOLESTEROL LESS THAN 70 MG/DL: Chronic | ICD-10-CM

## 2017-08-12 DIAGNOSIS — I21.02 ACUTE ST ELEVATION MYOCARDIAL INFARCTION (STEMI) INVOLVING LEFT ANTERIOR DESCENDING (LAD) CORONARY ARTERY (HCC): ICD-10-CM

## 2017-08-12 DIAGNOSIS — Z91.199 H/O NONCOMPLIANCE WITH MEDICAL TREATMENT, PRESENTING HAZARDS TO HEALTH: ICD-10-CM

## 2017-08-12 DIAGNOSIS — I25.10 CORONARY ARTERY DISEASE INVOLVING NATIVE CORONARY ARTERY OF NATIVE HEART WITHOUT ANGINA PECTORIS: Chronic | ICD-10-CM

## 2017-08-12 DIAGNOSIS — I21.3 ST ELEVATION MYOCARDIAL INFARCTION (STEMI), SUBSEQUENT EPISODE OF CARE (HCC): ICD-10-CM

## 2017-08-12 DIAGNOSIS — Z98.61 S/P PTCA (PERCUTANEOUS TRANSLUMINAL CORONARY ANGIOPLASTY): Chronic | ICD-10-CM

## 2017-09-08 LAB
ALBUMIN SERPL-MCNC: 4 G/DL (ref 3.6–4.8)
ALBUMIN/GLOB SERPL: 1.6 {RATIO} (ref 1.2–2.2)
ALP SERPL-CCNC: 67 IU/L (ref 39–117)
ALT SERPL-CCNC: 13 IU/L (ref 0–44)
AST SERPL-CCNC: 17 IU/L (ref 0–40)
BILIRUB SERPL-MCNC: 0.4 MG/DL (ref 0–1.2)
BUN SERPL-MCNC: 15 MG/DL (ref 8–27)
BUN/CREAT SERPL: 13 (ref 10–24)
CALCIUM SERPL-MCNC: 9.7 MG/DL (ref 8.6–10.2)
CHLORIDE SERPL-SCNC: 102 MMOL/L (ref 96–106)
CHOLEST SERPL-MCNC: 120 MG/DL (ref 100–199)
CK SERPL-CCNC: 59 U/L (ref 24–204)
CO2 SERPL-SCNC: 23 MMOL/L (ref 18–29)
CREAT SERPL-MCNC: 1.12 MG/DL (ref 0.76–1.27)
GLOBULIN SER CALC-MCNC: 2.5 G/DL (ref 1.5–4.5)
GLUCOSE SERPL-MCNC: 82 MG/DL (ref 65–99)
HDLC SERPL-MCNC: 36 MG/DL
LDLC SERPL CALC-MCNC: 54 MG/DL (ref 0–99)
POTASSIUM SERPL-SCNC: 4.8 MMOL/L (ref 3.5–5.2)
PROT SERPL-MCNC: 6.5 G/DL (ref 6–8.5)
SODIUM SERPL-SCNC: 142 MMOL/L (ref 134–144)
TRIGL SERPL-MCNC: 148 MG/DL (ref 0–149)
VLDLC SERPL CALC-MCNC: 30 MG/DL (ref 5–40)

## 2017-09-14 ENCOUNTER — TELEPHONE (OUTPATIENT)
Dept: CARDIOLOGY CLINIC | Age: 62
End: 2017-09-14

## 2017-09-14 NOTE — TELEPHONE ENCOUNTER
----- Message from Fabian Milligan MD sent at 9/14/2017  7:21 AM EDT -----  Please advise cholesterol is perfect and has been cut in half on his current medications. Continue with healthy diet and exercise and follow-up as scheduled. Please remind him we need to not fall out of touch in the future.

## 2017-09-14 NOTE — PROGRESS NOTES
Please advise cholesterol is perfect and has been cut in half on his current medications. Continue with healthy diet and exercise and follow-up as scheduled. Please remind him we need to not fall out of touch in the future.

## 2017-12-08 ENCOUNTER — OFFICE VISIT (OUTPATIENT)
Dept: CARDIOLOGY CLINIC | Age: 62
End: 2017-12-08

## 2017-12-08 VITALS
HEIGHT: 70 IN | BODY MASS INDEX: 25.05 KG/M2 | DIASTOLIC BLOOD PRESSURE: 68 MMHG | WEIGHT: 175 LBS | HEART RATE: 64 BPM | RESPIRATION RATE: 18 BRPM | OXYGEN SATURATION: 97 % | SYSTOLIC BLOOD PRESSURE: 118 MMHG

## 2017-12-08 DIAGNOSIS — E78.5 HYPERLIPIDEMIA WITH TARGET LOW DENSITY LIPOPROTEIN (LDL) CHOLESTEROL LESS THAN 70 MG/DL: Chronic | ICD-10-CM

## 2017-12-08 DIAGNOSIS — Z98.61 S/P PTCA (PERCUTANEOUS TRANSLUMINAL CORONARY ANGIOPLASTY): Chronic | ICD-10-CM

## 2017-12-08 DIAGNOSIS — I25.10 ASHD (ARTERIOSCLEROTIC HEART DISEASE): Primary | ICD-10-CM

## 2017-12-08 DIAGNOSIS — I21.02 ACUTE ST ELEVATION MYOCARDIAL INFARCTION (STEMI) INVOLVING LEFT ANTERIOR DESCENDING (LAD) CORONARY ARTERY (HCC): ICD-10-CM

## 2017-12-08 DIAGNOSIS — F17.200 TOBACCO USE DISORDER: Chronic | ICD-10-CM

## 2017-12-08 RX ORDER — GABAPENTIN 300 MG/1
300 CAPSULE ORAL
COMMUNITY
End: 2020-06-10 | Stop reason: ALTCHOICE

## 2017-12-08 NOTE — PROGRESS NOTES
1. Have you been to the ER, urgent care clinic since your last visit? Hospitalized since your last visit? No    2. Have you seen or consulted any other health care providers outside of the [address] [place] [address] since your last visit? Include any pap smears or colon screening. No    Chief Complaint   Patient presents with    Coronary Artery Disease     6 mo appt. Denied cardiac symptoms.

## 2017-12-08 NOTE — PROGRESS NOTES
[address] [facility] [address] [facility]  [phone]     Subjective:      Mil Matthews. is a 57 y.o. male is here for routine f/u. The patient denies chest pain/ shortness of breath, orthopnea, PND, LE edema, palpitations, syncope, or presyncope. Patient Active Problem List    Diagnosis Date Noted    ASHD (arteriosclerotic heart disease) 12/08/2017    Heart problem 05/12/2017    S/P coronary artery stent placement 04/26/2017    Acute ST elevation myocardial infarction (STEMI) involving left anterior descending (LAD) coronary artery ([facility] [address] 04/23/2017    H/O noncompliance with medical treatment, presenting hazards to health 04/23/2017    Coronary artery disease 06/11/2010    Hyperlipidemia with target low density lipoprotein (LDL) cholesterol less than 70 mg/dL 06/11/2010    S/P PTCA (percutaneous transluminal coronary angioplasty) 06/11/2010    Unstable angina ([facility] [address] 06/02/2010    Tobacco use disorder 06/02/2010      Sri Minaya MD  Past Medical History:   Diagnosis Date    CAD (coronary artery disease)     stent 6/10    Hypertension     Other ill-defined conditions(799.89)     high cholestrol      Past Surgical History:   Procedure Laterality Date    CARDIAC CATHETERIZATION  6/10/2010         DRUG ELUTING STENT SINGLE VESS  6/10/2010         FRACTIONAL FLOW RESERVE  6/10/2010         HC ANGIOSEAL VIP 6FR  6/10/2010         HX TONSILLECTOMY       Allergies   Allergen Reactions    Pcn [Penicillins] Swelling      Family History   Problem Relation Age of Onset    Kidney Disease Mother     Cancer Father       Social History     Social History    Marital status:      Spouse name: N/A    Number of children: N/A    Years of education: N/A     Occupational History    Not on file.      Social History Main Topics    Smoking status: Current Some Day Smoker     Packs/day: 0.50     Years: 38.00    Smokeless tobacco: Never Used      Comment: trying to quit tapering off    Alcohol use No      Comment: since 01/10, 1 beer    Drug use: Yes     Special: Prescription, OTC    Sexual activity: Yes     Other Topics Concern    Not on file     Social History Narrative      Current Outpatient Prescriptions   Medication Sig    gabapentin (NEURONTIN) 300 mg capsule Take 300 mg by mouth two (2) times a day.  aspirin delayed-release 81 mg tablet Take 1 Tab by mouth daily.  lisinopril (PRINIVIL, ZESTRIL) 2.5 mg tablet Take 1 Tab by mouth daily.  metoprolol tartrate (LOPRESSOR) 25 mg tablet Take 0.5 Tabs by mouth every twelve (12) hours.  ticagrelor (BRILINTA) 90 mg tablet Take 1 Tab by mouth every twelve (12) hours every twelve (12) hours.  rosuvastatin (CRESTOR) 20 mg tablet Take 1 Tab by mouth nightly.  alfuzosin SR (UROXATRAL) 10 mg SR tablet Take  by mouth daily.  isosorbide mononitrate ER (IMDUR) 30 mg tablet Take 1 Tab by mouth daily. No current facility-administered medications for this visit. Review of Symptoms:  11 systems reviewed, negative other than as stated in the HPI    Physical ExamPhysical Exam:    Vitals:    12/08/17 1334 12/08/17 1338   BP: 120/68 118/68   Pulse: 64    Resp: 18    SpO2: 97%    Weight: 175 lb (79.4 kg)    Height: 5' 10\" (1.778 m)      Body mass index is 25.11 kg/(m^2). General PE   Gen:  NAD  Mental Status - Alert. General Appearance - Not in acute distress. Chest and Lung Exam   Inspection: Accessory muscles - No use of accessory muscles in breathing. Auscultation:   Breath sounds: - Normal.   Cardiovascular   Inspection: Jugular vein - Bilateral - Inspection Normal.   Palpation/Percussion:   Apical Impulse: - Normal.   Auscultation: Rhythm - Regular. Heart Sounds - S1 WNL and S2 WNL. No S3 or S4. Murmurs & Other Heart Sounds: Auscultation of the heart reveals - No Murmurs. Peripheral Vascular   Upper Extremity: Inspection - Bilateral - No Cyanotic nailbeds or Digital clubbing.    Lower Extremity: Palpation: Edema - Bilateral - No edema. Abdomen:   Soft, non-tender, bowel sounds are active. Neuro: A&O times 3, CN and motor grossly WNL    Labs:   Lab Results   Component Value Date/Time    Cholesterol, total 120 09/07/2017 10:01 AM    Cholesterol, total 174 04/24/2017 04:36 AM    Cholesterol, total 132 03/28/2012 03:00 AM    Cholesterol, total 232 06/03/2010 04:45 AM    HDL Cholesterol 36 09/07/2017 10:01 AM    HDL Cholesterol 38 04/24/2017 04:36 AM    HDL Cholesterol 39 03/28/2012 03:00 AM    HDL Cholesterol 30 06/03/2010 04:45 AM    LDL, calculated 54 09/07/2017 10:01 AM    LDL, calculated 112.4 04/24/2017 04:36 AM    LDL, calculated 61 03/28/2012 03:00 AM    LDL, calculated 151.2 06/03/2010 04:45 AM    Triglyceride 148 09/07/2017 10:01 AM    Triglyceride 118 04/24/2017 04:36 AM    Triglyceride 159 03/28/2012 03:00 AM    Triglyceride 254 06/03/2010 04:45 AM    CHOL/HDL Ratio 4.6 04/24/2017 04:36 AM    CHOL/HDL Ratio 7.7 06/03/2010 04:45 AM     Lab Results   Component Value Date/Time     04/24/2017 04:36 AM     Lab Results   Component Value Date/Time    Sodium 142 09/07/2017 10:01 AM    Potassium 4.8 09/07/2017 10:01 AM    Chloride 102 09/07/2017 10:01 AM    CO2 23 09/07/2017 10:01 AM    Anion gap 8 04/24/2017 04:36 AM    Glucose 82 09/07/2017 10:01 AM    BUN 15 09/07/2017 10:01 AM    Creatinine 1.12 09/07/2017 10:01 AM    BUN/Creatinine ratio 13 09/07/2017 10:01 AM    GFR est AA 80 09/07/2017 10:01 AM    GFR est non-AA 69 09/07/2017 10:01 AM    Calcium 9.7 09/07/2017 10:01 AM    Bilirubin, total 0.4 09/07/2017 10:01 AM    AST (SGOT) 17 09/07/2017 10:01 AM    Alk. phosphatase 67 09/07/2017 10:01 AM    Protein, total 6.5 09/07/2017 10:01 AM    Albumin 4.0 09/07/2017 10:01 AM    Globulin 3.3 04/23/2017 01:40 PM    A-G Ratio 1.6 09/07/2017 10:01 AM    ALT (SGPT) 13 09/07/2017 10:01 AM       EKG:  NSR      Assessment:        1. ASHD (arteriosclerotic heart disease)    2.  Hyperlipidemia with target low density lipoprotein (LDL) cholesterol less than 70 mg/dL    3. Acute ST elevation myocardial infarction (STEMI) involving left anterior descending (LAD) coronary artery ([address]    4. S/P PTCA (percutaneous transluminal coronary angioplasty)    5. Tobacco use disorder        Orders Placed This Encounter    AMB POC EKG ROUTINE W/ 12 LEADS, INTER & REP     Order Specific Question:   Reason for Exam:     Answer:   routine    gabapentin (NEURONTIN) 300 mg capsule     Sig: Take 300 mg by mouth two (2) times a day. Plan:     CAD:  Doing well with no cardiac symptoms s/p VINCENZO to LAD for STEMI 4/17. Compliant with meds.     Smoking:  has stopped smoking- slipped up 2 times by habit. Exercising and eating right. Rare cigarrette. Back pain:  7/2017- ruptured disc improved with steroid injection.     LDL at goal 5/2017.     BP at goal.    Counseled on diet and exercise- eventual goal of 30-60 minutes 5-7 times a week as per AHA guidelines.      Continue current care and f/u in 6 months.     Ivania Agosto MD

## 2017-12-08 NOTE — MR AVS SNAPSHOT
Visit Information Date & Time Provider Department Dept. Phone Encounter #  
 12/8/2017  1:30 PM Koki Shelley, [address] [place] [address] Cardiology Associate Jose [phone] [id number] Follow-up Instructions Return in about 6 months (around 6/8/2018). Routing History Follow-up and Disposition History Upcoming Health Maintenance Date Due Hepatitis C Screening 1955 DTaP/Tdap/Td series (1 - Tdap) 1/24/1974 FOBT Q 1 YEAR AGE 50-75 1/24/2003 ZOSTER VACCINE AGE 60> 11/24/2012 Influenza Age 5 to Adult 8/1/2017 Allergies as of 12/8/2017  Review Complete On: 12/8/2017 By: Koki Shelley MD  
  
 Severity Noted Reaction Type Reactions Pcn [Penicillins]  06/02/2010    Swelling Current Immunizations  Never Reviewed No immunizations on file. Not reviewed this visit You Were Diagnosed With   
  
 Codes Comments ASHD (arteriosclerotic heart disease)    -  Primary ICD-10-CM: I25.10 ICD-9-CM: 414.00 Hyperlipidemia with target low density lipoprotein (LDL) cholesterol less than 70 mg/dL     ICD-10-CM: E78.5 ICD-9-CM: 272.4 Acute ST elevation myocardial infarction (STEMI) involving left anterior descending (LAD) coronary artery (HCC)     ICD-10-CM: I21.02 
ICD-9-CM: 410.10 S/P PTCA (percutaneous transluminal coronary angioplasty)     ICD-10-CM: Z98.61 ICD-9-CM: V45.82 Tobacco use disorder     ICD-10-CM: F17.200 ICD-9-CM: 305.1 Vitals BP Pulse Resp Height(growth percentile) Weight(growth percentile) SpO2  
 118/68 (BP 1 Location: Left arm, BP Patient Position: Sitting) 64 18 5' 10\" (1.778 m) 175 lb (79.4 kg) 97% BMI Smoking Status 25.11 kg/m2 Current Some Day Smoker Vitals History BMI and BSA Data Body Mass Index Body Surface Area  
 25.11 kg/m 2 1.98 m 2 Preferred Pharmacy Pharmacy Name Phone [facility] [address] [phone] Your Updated Medication List  
  
   
This list is accurate as of: 12/8/17  2:14 PM.  Always use your most recent med list.  
  
  
  
  
 alfuzosin SR 10 mg SR tablet Commonly known as:  Lorelie Prince Take  by mouth daily. aspirin delayed-release 81 mg tablet Take 1 Tab by mouth daily. gabapentin 300 mg capsule Commonly known as:  NEURONTIN Take 300 mg by mouth two (2) times a day. isosorbide mononitrate ER 30 mg tablet Commonly known as:  IMDUR Take 1 Tab by mouth daily. lisinopril 2.5 mg tablet Commonly known as:  Zoey Shames Take 1 Tab by mouth daily. metoprolol tartrate 25 mg tablet Commonly known as:  LOPRESSOR Take 0.5 Tabs by mouth every twelve (12) hours. rosuvastatin 20 mg tablet Commonly known as:  CRESTOR Take 1 Tab by mouth nightly. ticagrelor 90 mg tablet Commonly known as:  [employer] Take 1 Tab by mouth every twelve (12) hours every twelve (12) hours. We Performed the Following AMB POC EKG ROUTINE W/ 12 LEADS, INTER & REP [90057 CPT(R)] Follow-up Instructions Return in about 6 months (around 6/8/2018). Introducing [facility]! [facility] introduces [employer] patient portal. Now you can access parts of your medical record, email your doctor's office, and request medication refills online. 1. In your internet browser, go to [email or web address] 2. Click on the First Time User? Click Here link in the Sign In box. You will see the New Member Sign Up page. 3. Enter your [employer] Access Code exactly as it appears below. You will not need to use this code after youve completed the sign-up process. If you do not sign up before the expiration date, you must request a new code. · [employer] Access Code: [email or web address] Expires: 3/8/2018  1:28 PM 
 
4.  Enter the last four digits of your Social Security Number (xxxx) and Date of Birth (mm/dd/yyyy) as indicated and click Submit. You will be taken to the next sign-up page. 5. Create a TRData ID. This will be your [employer]Data login ID and cannot be changed, so think of one that is secure and easy to remember. 6. Create a [employer]Data password. You can change your password at any time. 7. Enter your Password Reset Question and Answer. This can be used at a later time if you forget your password. 8. Enter your e-mail address. You will receive e-mail notification when new information is available in [address] 9. Click Sign Up. You can now view and download portions of your medical record. 10. Click the Download Summary menu link to download a portable copy of your medical information. If you have questions, please visit the Frequently Asked Questions section of the [employer] website. Remember, TRData is NOT to be used for urgent needs. For medical emergencies, dial 911. Now available from your iPhone and Android! Please provide this summary of care documentation to your next provider. Your primary care clinician is listed as Nichole Irizarry. If you have any questions after today's visit, please call [phone].

## 2018-04-17 RX ORDER — LISINOPRIL 2.5 MG/1
2.5 TABLET ORAL DAILY
Qty: 30 TAB | Refills: 3 | Status: SHIPPED | OUTPATIENT
Start: 2018-04-17 | End: 2018-06-08 | Stop reason: SDUPTHER

## 2018-04-17 RX ORDER — ROSUVASTATIN CALCIUM 20 MG/1
20 TABLET, COATED ORAL
Qty: 30 TAB | Refills: 3 | Status: SHIPPED | OUTPATIENT
Start: 2018-04-17 | End: 2018-06-08 | Stop reason: SDUPTHER

## 2018-06-08 ENCOUNTER — OFFICE VISIT (OUTPATIENT)
Dept: CARDIOLOGY CLINIC | Age: 63
End: 2018-06-08

## 2018-06-08 VITALS
WEIGHT: 163.4 LBS | HEIGHT: 70 IN | SYSTOLIC BLOOD PRESSURE: 110 MMHG | HEART RATE: 70 BPM | DIASTOLIC BLOOD PRESSURE: 60 MMHG | BODY MASS INDEX: 23.39 KG/M2 | OXYGEN SATURATION: 97 % | RESPIRATION RATE: 16 BRPM

## 2018-06-08 DIAGNOSIS — I21.02 ACUTE ST ELEVATION MYOCARDIAL INFARCTION (STEMI) INVOLVING LEFT ANTERIOR DESCENDING (LAD) CORONARY ARTERY (HCC): ICD-10-CM

## 2018-06-08 DIAGNOSIS — I25.10 CORONARY ARTERY DISEASE INVOLVING NATIVE CORONARY ARTERY OF NATIVE HEART WITHOUT ANGINA PECTORIS: Primary | Chronic | ICD-10-CM

## 2018-06-08 DIAGNOSIS — I25.10 ASHD (ARTERIOSCLEROTIC HEART DISEASE): ICD-10-CM

## 2018-06-08 DIAGNOSIS — E78.5 HYPERLIPIDEMIA WITH TARGET LOW DENSITY LIPOPROTEIN (LDL) CHOLESTEROL LESS THAN 70 MG/DL: Chronic | ICD-10-CM

## 2018-06-08 DIAGNOSIS — Z98.61 S/P PTCA (PERCUTANEOUS TRANSLUMINAL CORONARY ANGIOPLASTY): Chronic | ICD-10-CM

## 2018-06-08 DIAGNOSIS — F17.200 TOBACCO USE DISORDER: Chronic | ICD-10-CM

## 2018-06-08 RX ORDER — METOPROLOL TARTRATE 25 MG/1
12.5 TABLET, FILM COATED ORAL EVERY 12 HOURS
Qty: 90 TAB | Refills: 4 | Status: SHIPPED | OUTPATIENT
Start: 2018-06-08 | End: 2018-06-08 | Stop reason: SDUPTHER

## 2018-06-08 RX ORDER — LISINOPRIL 2.5 MG/1
2.5 TABLET ORAL DAILY
Qty: 90 TAB | Refills: 4 | Status: SHIPPED | OUTPATIENT
Start: 2018-06-08 | End: 2019-06-10 | Stop reason: SDUPTHER

## 2018-06-08 RX ORDER — ISOSORBIDE MONONITRATE 30 MG/1
30 TABLET, EXTENDED RELEASE ORAL DAILY
Qty: 90 TAB | Refills: 4 | Status: SHIPPED | OUTPATIENT
Start: 2018-06-08 | End: 2019-06-10 | Stop reason: SDUPTHER

## 2018-06-08 RX ORDER — ROSUVASTATIN CALCIUM 20 MG/1
20 TABLET, COATED ORAL
Qty: 90 TAB | Refills: 4 | Status: SHIPPED | OUTPATIENT
Start: 2018-06-08 | End: 2019-06-10 | Stop reason: SDUPTHER

## 2018-06-08 NOTE — PROGRESS NOTES
[address] [address] [address] [facility]  [phone]     Subjective:      Melissa Gatica is a 70 y.o. male is here for routine f/u. The patient denies chest pain/ shortness of breath, orthopnea, PND, LE edema, palpitations, syncope, or presyncope. The patient complains of fatigue and weight loss. Patient Active Problem List    Diagnosis Date Noted    ASHD (arteriosclerotic heart disease) 12/08/2017    Heart problem 05/12/2017    S/P coronary artery stent placement 04/26/2017    Acute ST elevation myocardial infarction (STEMI) involving left anterior descending (LAD) coronary artery ([facility] 04/23/2017    H/O noncompliance with medical treatment, presenting hazards to health 04/23/2017    Coronary artery disease 06/11/2010    Hyperlipidemia with target low density lipoprotein (LDL) cholesterol less than 70 mg/dL 06/11/2010    S/P PTCA (percutaneous transluminal coronary angioplasty) 06/11/2010    [facility] 06/02/2010    Tobacco use disorder 06/02/2010      Yenny Phelps MD  Past Medical History:   Diagnosis Date    CAD (coronary artery disease)     stent 6/10    Hypertension     Other ill-defined conditions(799.89)     high cholestrol      Past Surgical History:   Procedure Laterality Date    CARDIAC CATHETERIZATION  6/10/2010         DRUG ELUTING STENT SINGLE VESS  6/10/2010         FRACTIONAL FLOW RESERVE  6/10/2010         HC ANGIOSEAL VIP 6FR  6/10/2010         HX TONSILLECTOMY       Allergies   Allergen Reactions    Pcn [Penicillins] Swelling      Family History   Problem Relation Age of Onset    Kidney Disease Mother     Cancer Father       Social History     Social History    Marital status:      Spouse name: N/A    Number of children: N/A    Years of education: N/A     Occupational History    Not on file.      Social History Main Topics    Smoking status: Current Some Day Smoker     Packs/day: 0.50     Years: 38.00    Smokeless tobacco: Never Used      Comment: trying to quit tapering off    Alcohol use No      Comment: since 01/10, 1 beer    Drug use: Yes     Special: Prescription, OTC    Sexual activity: Yes     Other Topics Concern    Not on file     Social History Narrative      Current Outpatient Prescriptions   Medication Sig    lisinopril (PRINIVIL, ZESTRIL) 2.5 mg tablet Take 1 Tab by mouth daily.  rosuvastatin (CRESTOR) 20 mg tablet Take 1 Tab by mouth nightly.  gabapentin (NEURONTIN) 300 mg capsule Take 300 mg by mouth every fourty-eight (48) hours.  aspirin delayed-release 81 mg tablet Take 1 Tab by mouth daily.  metoprolol tartrate (LOPRESSOR) 25 mg tablet Take 0.5 Tabs by mouth every twelve (12) hours.  ticagrelor (BRILINTA) 90 mg tablet Take 1 Tab by mouth every twelve (12) hours every twelve (12) hours.  alfuzosin SR (UROXATRAL) 10 mg SR tablet Take  by mouth daily.  isosorbide mononitrate ER (IMDUR) 30 mg tablet Take 1 Tab by mouth daily. No current facility-administered medications for this visit. Review of Symptoms:  11 systems reviewed, negative other than as stated in the HPI    Physical ExamPhysical Exam:    Vitals:    06/08/18 1518 06/08/18 1519   BP: 90/60 110/60   Pulse: 70    Resp: 16    SpO2: 97%    Weight: 163 lb 6.4 oz (74.1 kg)    Height: 5' 10\" (1.778 m)      Body mass index is 23.45 kg/(m^2). General PE   Gen:  NAD  Mental Status - Alert. General Appearance - Not in acute distress. Chest and Lung Exam   Inspection: Accessory muscles - No use of accessory muscles in breathing. Auscultation:   Breath sounds: - Normal.   Cardiovascular   Inspection: Jugular vein - Bilateral - Inspection Normal.   Palpation/Percussion:   Apical Impulse: - Normal.   Auscultation: Rhythm - Regular. Heart Sounds - S1 WNL and S2 WNL. No S3 or S4. Murmurs & Other Heart Sounds: Auscultation of the heart reveals - No Murmurs.    Peripheral Vascular   Upper Extremity: Inspection - Bilateral - No Cyanotic nailbeds or Digital clubbing. Lower Extremity:   Palpation: Edema - Bilateral - No edema. Abdomen:   Soft, non-tender, bowel sounds are active. Neuro: A&O times 3, CN and motor grossly WNL    Labs:   Lab Results   Component Value Date/Time    Cholesterol, total 120 09/07/2017 10:01 AM    Cholesterol, total 174 04/24/2017 04:36 AM    Cholesterol, total 132 03/28/2012 03:00 AM    Cholesterol, total 232 (H) 06/03/2010 04:45 AM    HDL Cholesterol 36 (L) 09/07/2017 10:01 AM    HDL Cholesterol 38 04/24/2017 04:36 AM    HDL Cholesterol 39 (L) 03/28/2012 03:00 AM    HDL Cholesterol 30 06/03/2010 04:45 AM    LDL, calculated 54 09/07/2017 10:01 AM    LDL, calculated 112.4 (H) 04/24/2017 04:36 AM    LDL, calculated 61 03/28/2012 03:00 AM    LDL, calculated 151.2 (H) 06/03/2010 04:45 AM    Triglyceride 148 09/07/2017 10:01 AM    Triglyceride 118 04/24/2017 04:36 AM    Triglyceride 159 (H) 03/28/2012 03:00 AM    Triglyceride 254 (H) 06/03/2010 04:45 AM    CHOL/HDL Ratio 4.6 04/24/2017 04:36 AM    CHOL/HDL Ratio 7.7 (H) 06/03/2010 04:45 AM     Lab Results   Component Value Date/Time     (H) 04/24/2017 04:36 AM     Lab Results   Component Value Date/Time    Sodium 142 09/07/2017 10:01 AM    Potassium 4.8 09/07/2017 10:01 AM    Chloride 102 09/07/2017 10:01 AM    CO2 23 09/07/2017 10:01 AM    Anion gap 8 04/24/2017 04:36 AM    Glucose 82 09/07/2017 10:01 AM    BUN 15 09/07/2017 10:01 AM    Creatinine 1.12 09/07/2017 10:01 AM    BUN/Creatinine ratio 13 09/07/2017 10:01 AM    GFR est AA 80 09/07/2017 10:01 AM    GFR est non-AA 69 09/07/2017 10:01 AM    Calcium 9.7 09/07/2017 10:01 AM    Bilirubin, total 0.4 09/07/2017 10:01 AM    AST (SGOT) 17 09/07/2017 10:01 AM    Alk.  phosphatase 67 09/07/2017 10:01 AM    Protein, total 6.5 09/07/2017 10:01 AM    Albumin 4.0 09/07/2017 10:01 AM    Globulin 3.3 04/23/2017 01:40 PM    A-G Ratio 1.6 09/07/2017 10:01 AM    ALT (SGPT) 13 09/07/2017 10:01 AM       EKG:  NSR Assessment:        1. Coronary artery disease involving native coronary artery of native heart without angina pectoris    2. Hyperlipidemia with target low density lipoprotein (LDL) cholesterol less than 70 mg/dL    3. ASHD (arteriosclerotic heart disease)    4. Acute ST elevation myocardial infarction (STEMI) involving left anterior descending (LAD) coronary artery ([facility]    5. S/P PTCA (percutaneous transluminal coronary angioplasty)    6. Tobacco use disorder        Orders Placed This Encounter    AMB POC EKG ROUTINE W/ 12 LEADS, INTER & REP     Order Specific Question:   Reason for Exam:     Answer:   routine        Plan:     CAD:  Doing well with no cardiac symptoms s/p VINCENZO to LAD for STEMI 4/17.  Compliant with meds. Decrease Brilinta to 60 mg twice a day in light of the Pegasus trial.      Smoking:  Exercising and eating right.  Rare cigarrette.     Back pain:  7/2017- ruptured disc improved with steroid injection.      LDL at goal 5/2017.      BP at goal.      Fatigue and weight loss:  Advised to discuss further with PCP. No lymphadenopathy in the neck, supraclavicular, sub-axillary, chest x-ray okay 4 of 2017, but long history of smoking, states he is due for colonoscopy as well. Consider further evaluation. It may also be related to high levels of activity, improved diet since his heart attack, and skipping meals and his busy work day. Discussed foods high in protein. Counseled on diet and exercise- eventual goal of 30-60 minutes 5-7 times a week as per AHA guidelines.      Continue current care and f/u in 6 months.     Lenore Parham MD

## 2018-06-08 NOTE — PROGRESS NOTES
1. Have you been to the ER, urgent care clinic since your last visit? Hospitalized since your last visit? No    2. Have you seen or consulted any other health care providers outside of the [address] [place] [address] since your last visit? Include any pap smears or colon screening. No     Patient C/O fatigue and weight loss has been very busy with activity.

## 2018-06-11 RX ORDER — METOPROLOL TARTRATE 25 MG/1
12.5 TABLET, FILM COATED ORAL EVERY 12 HOURS
Qty: 90 TAB | Refills: 4 | Status: SHIPPED | OUTPATIENT
Start: 2018-06-11 | End: 2019-06-27 | Stop reason: SDUPTHER

## 2019-06-07 ENCOUNTER — OFFICE VISIT (OUTPATIENT)
Dept: CARDIOLOGY CLINIC | Age: 64
End: 2019-06-07

## 2019-06-07 ENCOUNTER — TELEPHONE (OUTPATIENT)
Dept: CARDIOLOGY CLINIC | Age: 64
End: 2019-06-07

## 2019-06-07 VITALS
OXYGEN SATURATION: 95 % | HEART RATE: 62 BPM | SYSTOLIC BLOOD PRESSURE: 118 MMHG | HEIGHT: 70 IN | BODY MASS INDEX: 23.22 KG/M2 | RESPIRATION RATE: 16 BRPM | WEIGHT: 162.2 LBS | DIASTOLIC BLOOD PRESSURE: 66 MMHG

## 2019-06-07 DIAGNOSIS — I25.10 ASHD (ARTERIOSCLEROTIC HEART DISEASE): ICD-10-CM

## 2019-06-07 DIAGNOSIS — Z98.61 S/P PTCA (PERCUTANEOUS TRANSLUMINAL CORONARY ANGIOPLASTY): ICD-10-CM

## 2019-06-07 DIAGNOSIS — Z95.5 S/P CORONARY ARTERY STENT PLACEMENT: ICD-10-CM

## 2019-06-07 DIAGNOSIS — E78.5 HYPERLIPIDEMIA WITH TARGET LOW DENSITY LIPOPROTEIN (LDL) CHOLESTEROL LESS THAN 70 MG/DL: ICD-10-CM

## 2019-06-07 DIAGNOSIS — F17.200 TOBACCO USE DISORDER: ICD-10-CM

## 2019-06-07 DIAGNOSIS — I25.10 CORONARY ARTERY DISEASE INVOLVING NATIVE CORONARY ARTERY OF NATIVE HEART WITHOUT ANGINA PECTORIS: Primary | ICD-10-CM

## 2019-06-07 NOTE — PROGRESS NOTES
[address] [place] [address], [address] [facility]  [phone]     Subjective:      Nohemi Traore is a 75 y.o. male is here for routine f/u. The patient denies chest pain/ shortness of breath, orthopnea, PND, LE edema, palpitations, syncope, or presyncope. Weight has been stable, he has been supplementing with Premier protein.     Patient Active Problem List    Diagnosis Date Noted    ASHD (arteriosclerotic heart disease) 12/08/2017    Heart problem 05/12/2017    S/P coronary artery stent placement 04/26/2017    Acute ST elevation myocardial infarction (STEMI) involving left anterior descending (LAD) coronary artery ([facility] 04/23/2017    H/O noncompliance with medical treatment, presenting hazards to health 04/23/2017    Coronary artery disease 06/11/2010    Hyperlipidemia with target low density lipoprotein (LDL) cholesterol less than 70 mg/dL 06/11/2010    S/P PTCA (percutaneous transluminal coronary angioplasty) 06/11/2010    Unstable angina ([facility] 06/02/2010    Tobacco use disorder 06/02/2010      Gwyn Alpers, MD  Past Medical History:   Diagnosis Date    CAD (coronary artery disease)     stent 6/10    Hypertension     Other ill-defined conditions(799.89)     high cholestrol      Past Surgical History:   Procedure Laterality Date    CARDIAC CATHETERIZATION  6/10/2010         DRUG ELUTING STENT SINGLE VESS  6/10/2010         FRACTIONAL FLOW RESERVE  6/10/2010         HC ANGIOSEAL VIP 6FR  6/10/2010         HX TONSILLECTOMY       Allergies   Allergen Reactions    Pcn [Penicillins] Swelling      Family History   Problem Relation Age of Onset    Kidney Disease Mother     Cancer Father       Social History     Socioeconomic History    Marital status:      Spouse name: Not on file    Number of children: Not on file    Years of education: Not on file    Highest education level: Not on file   Occupational History    Not on file   Social Needs    Financial resource strain: Not on file    Food insecurity:     Worry: Not on file     Inability: Not on file    Transportation needs:     Medical: Not on file     Non-medical: Not on file   Tobacco Use    Smoking status: Current Some Day Smoker     Packs/day: 0.50     Years: 38.00     Pack years: 19.00     Types: Cigarettes    Smokeless tobacco: Never Used    Tobacco comment: 1-2 CIGARETTES DAILY   Substance and Sexual Activity    Alcohol use: No     Comment: since 01/10, 1 beer    Drug use: Not Currently     Types: Prescription, OTC    Sexual activity: Yes   Lifestyle    Physical activity:     Days per week: Not on file     Minutes per session: Not on file    Stress: Not on file   Relationships    Social connections:     Talks on phone: Not on file     Gets together: Not on file     Attends Jewish service: Not on file     Active member of club or organization: Not on file     Attends meetings of clubs or organizations: Not on file     Relationship status: Not on file    Intimate partner violence:     Fear of current or ex partner: Not on file     Emotionally abused: Not on file     Physically abused: Not on file     Forced sexual activity: Not on file   Other Topics Concern    Not on file   Social History Narrative    Not on file      Current Outpatient Medications   Medication Sig    varicella-zoster recombinant, PF, (SHINGRIX, PF,) 50 mcg/0.5 mL susr injection 0.5 mL by IntraMUSCular route.  metoprolol tartrate (LOPRESSOR) 25 mg tablet Take 0.5 Tabs by mouth every twelve (12) hours.  lisinopril (PRINIVIL, ZESTRIL) 2.5 mg tablet Take 1 Tab by mouth daily.  rosuvastatin (CRESTOR) 20 mg tablet Take 1 Tab by mouth nightly.  ticagrelor (BRILINTA) 60 mg tab tablet Take 1 Tab by mouth every twelve (12) hours every twelve (12) hours.  aspirin delayed-release 81 mg tablet Take 1 Tab by mouth daily.  alfuzosin SR (UROXATRAL) 10 mg SR tablet Take  by mouth daily.     isosorbide mononitrate ER (IMDUR) 30 mg tablet Take 1 Tab by mouth daily.  gabapentin (NEURONTIN) 300 mg capsule Take 300 mg by mouth every fourty-eight (48) hours. No current facility-administered medications for this visit. Review of Symptoms:  11 systems reviewed, negative other than as stated in the HPI    Physical ExamPhysical Exam:    Vitals:    06/07/19 1352 06/07/19 1405   BP: 110/70 118/66   Pulse: 62    Resp: 16    SpO2: 95%    Weight: 162 lb 3.2 oz (73.6 kg)    Height: 5' 10\" (1.778 m)      Body mass index is 23.27 kg/m². General PE   Gen:  NAD  Mental Status - Alert. General Appearance - Not in acute distress. Chest and Lung Exam   Inspection: Accessory muscles - No use of accessory muscles in breathing. Auscultation:   Breath sounds: - Normal.   Cardiovascular   Inspection: Jugular vein - Bilateral - Inspection Normal.   Palpation/Percussion:   Apical Impulse: - Normal.   Auscultation: Rhythm - Regular. Heart Sounds - S1 WNL and S2 WNL. No S3 or S4. Murmurs & Other Heart Sounds: Auscultation of the heart reveals - No Murmurs. Peripheral Vascular   Upper Extremity: Inspection - Bilateral - No Cyanotic nailbeds or Digital clubbing. Lower Extremity:   Palpation: Edema - Bilateral - No edema. Abdomen:   Soft, non-tender, bowel sounds are active.   Neuro: A&O times 3, CN and motor grossly WNL    Labs:   Lab Results   Component Value Date/Time    Cholesterol, total 120 09/07/2017 10:01 AM    Cholesterol, total 174 04/24/2017 04:36 AM    Cholesterol, total 132 03/28/2012 03:00 AM    Cholesterol, total 232 (H) 06/03/2010 04:45 AM    HDL Cholesterol 36 (L) 09/07/2017 10:01 AM    HDL Cholesterol 38 04/24/2017 04:36 AM    HDL Cholesterol 39 (L) 03/28/2012 03:00 AM    HDL Cholesterol 30 06/03/2010 04:45 AM    LDL, calculated 54 09/07/2017 10:01 AM    LDL, calculated 112.4 (H) 04/24/2017 04:36 AM    LDL, calculated 61 03/28/2012 03:00 AM    LDL, calculated 151.2 (H) 06/03/2010 04:45 AM    Triglyceride 148 2017 10:01 AM    Triglyceride 118 2017 04:36 AM    Triglyceride 159 (H) 2012 03:00 AM    Triglyceride 254 (H) 2010 04:45 AM    CHOL/HDL Ratio 4.6 2017 04:36 AM    CHOL/HDL Ratio 7.7 (H) 2010 04:45 AM     Lab Results   Component Value Date/Time     (H) 2017 04:36 AM     Lab Results   Component Value Date/Time    Sodium 142 2017 10:01 AM    Potassium 4.8 2017 10:01 AM    Chloride 102 2017 10:01 AM    CO2 23 2017 10:01 AM    Anion gap 8 2017 04:36 AM    Glucose 82 2017 10:01 AM    BUN 15 2017 10:01 AM    Creatinine 1.12 2017 10:01 AM    BUN/Creatinine ratio 13 2017 10:01 AM    GFR est AA 80 2017 10:01 AM    GFR est non-AA 69 2017 10:01 AM    Calcium 9.7 2017 10:01 AM    Bilirubin, total 0.4 2017 10:01 AM    AST (SGOT) 17 2017 10:01 AM    Alk. phosphatase 67 2017 10:01 AM    Protein, total 6.5 2017 10:01 AM    Albumin 4.0 2017 10:01 AM    Globulin 3.3 2017 01:40 PM    A-G Ratio 1.6 2017 10:01 AM    ALT (SGPT) 13 2017 10:01 AM       EKG:  NSR      Assessment:        1. Coronary artery disease involving native coronary artery of native heart without angina pectoris    2. ASHD (arteriosclerotic heart disease)    3. S/P coronary artery stent placement    4. S/P PTCA (percutaneous transluminal coronary angioplasty)    5. Hyperlipidemia with target low density lipoprotein (LDL) cholesterol less than 70 mg/dL    6. Tobacco use disorder        Orders Placed This Encounter    AMB POC EKG ROUTINE W/ 12 LEADS, INTER & REP     Order Specific Question:   Reason for Exam:     Answer:   ROUTINE    varicella-zoster recombinant, PF, (SHINGRIX, PF,) 50 mcg/0.5 mL susr injection     Si.5 mL by IntraMUSCular route. Plan:     CAD:  Doing well with no cardiac symptoms s/p VINCENZO to LAD for STEMI .  Compliant with meds.   Decrease Brilinta to 60 mg twice a day in light of the Pegasus trial.  Echo showed LVEF 55 to 60% no valvular pathology April 2017.      Smoking:  Exercising and eating right.  Rare cigarrette1-2 a day. Counseled on smoking5 minutes. 56-year-old male smoker with history of ASCVD:  Order abdominal aortic ultrasound for AAA screening.     Back pain:  7/2017- ruptured disc improved with steroid injection.      Lipids:  Lipid panel received from PCP 5/20/2019 LDL 50, HDL 45, LFTs within normal limits.      BP at goal.      Fatigue and weight loss:  Has been stable over the past year, has been supplementing with Premier protein. PCP is aware that at one point he had some weight loss. Counseled on diet and exercise- eventual goal of 30-60 minutes 5-7 times a week as per AHA guidelines.      Continue current care and f/u in 1 year, sooner as needed.     Makayla Yeager MD

## 2019-06-07 NOTE — LETTER
6/7/19 Patient: Marisol Urbina. YOB: 1955 Date of Visit: 6/7/2019 Bruce Vazquez MD 
[address] VIA Facsimile: [phone] Dear Bruce Vazquez MD, Thank you for referring Mr. Jim Martínez to [address] [place] [address] for evaluation. My notes for this consultation are attached. If you have questions, please do not hesitate to call me. I look forward to following your patient along with you.  
 
 
Sincerely, 
 
Griffin Hernandez MD

## 2019-06-07 NOTE — TELEPHONE ENCOUNTER
Called PCP office to get most recent labs/lipids faxed to our office. She advised she would get that faxed over to our office.

## 2019-06-07 NOTE — PROGRESS NOTES
1. Have you been to the ER, urgent care clinic since your last visit? Hospitalized since your last visit? NO.    2. Have you seen or consulted any other health care providers outside of the [address] [place] [address] since your last visit? Include any pap smears or colon screening. SEEN BY PCP.       Chief Complaint   Patient presents with    Annual Exam     PT DENIES ANY CARDIAC SYMPTOMS

## 2019-06-10 ENCOUNTER — TELEPHONE (OUTPATIENT)
Dept: CARDIOLOGY CLINIC | Age: 64
End: 2019-06-10

## 2019-06-10 RX ORDER — ISOSORBIDE MONONITRATE 30 MG/1
30 TABLET, EXTENDED RELEASE ORAL DAILY
Qty: 90 TAB | Refills: 4 | Status: SHIPPED | OUTPATIENT
Start: 2019-06-10 | End: 2021-06-04 | Stop reason: ALTCHOICE

## 2019-06-10 RX ORDER — ROSUVASTATIN CALCIUM 20 MG/1
20 TABLET, COATED ORAL
Qty: 90 TAB | Refills: 4 | Status: SHIPPED | OUTPATIENT
Start: 2019-06-10 | End: 2020-08-25

## 2019-06-10 RX ORDER — LISINOPRIL 2.5 MG/1
2.5 TABLET ORAL DAILY
Qty: 90 TAB | Refills: 4 | Status: SHIPPED | OUTPATIENT
Start: 2019-06-10 | End: 2020-08-25

## 2019-06-10 NOTE — TELEPHONE ENCOUNTER
Per patient Dr. Tamiko Bernard was to authorize refills on all medicines at last visit, June 7, but pharmacy has not yet received. Please call pharmacy, [facility], for refills. Thanks!

## 2019-06-26 ENCOUNTER — TELEPHONE (OUTPATIENT)
Dept: CARDIOLOGY CLINIC | Age: 64
End: 2019-06-26

## 2019-06-26 NOTE — TELEPHONE ENCOUNTER
Pt needs refill on metoprolol sent to [facility] - INPATIENT.     Thanks, Tienda Nube / [employer]

## 2019-06-27 RX ORDER — METOPROLOL TARTRATE 25 MG/1
12.5 TABLET, FILM COATED ORAL EVERY 12 HOURS
Qty: 90 TAB | Refills: 4 | Status: SHIPPED | OUTPATIENT
Start: 2019-06-27 | End: 2020-10-30

## 2019-08-27 ENCOUNTER — HOSPITAL ENCOUNTER (EMERGENCY)
Age: 64
Discharge: HOME | End: 2019-08-27
Attending: EMERGENCY MEDICINE
Payer: MEDICARE

## 2019-08-27 VITALS
SYSTOLIC BLOOD PRESSURE: 123 MMHG | TEMPERATURE: 97.6 F | BODY MASS INDEX: 24.2 KG/M2 | DIASTOLIC BLOOD PRESSURE: 62 MMHG | HEIGHT: 69 IN | HEART RATE: 73 BPM | WEIGHT: 163.36 LBS | OXYGEN SATURATION: 96 % | RESPIRATION RATE: 18 BRPM

## 2019-08-27 DIAGNOSIS — R33.9 URINARY RETENTION: Primary | ICD-10-CM

## 2019-08-27 LAB
ANION GAP SERPL CALC-SCNC: 9 MMOL/L (ref 5–15)
APPEARANCE UR: CLEAR
BACTERIA URNS QL MICRO: NEGATIVE /HPF
BASOPHILS # BLD: 0 K/UL (ref 0–0.1)
BASOPHILS NFR BLD: 0 % (ref 0–1)
BILIRUB UR QL: NEGATIVE
BUN SERPL-MCNC: 17 MG/DL (ref 6–20)
BUN/CREAT SERPL: 14 (ref 12–20)
CALCIUM SERPL-MCNC: 9.5 MG/DL (ref 8.5–10.1)
CHLORIDE SERPL-SCNC: 101 MMOL/L (ref 97–108)
CO2 SERPL-SCNC: 26 MMOL/L (ref 21–32)
COLOR UR: ABNORMAL
CREAT SERPL-MCNC: 1.25 MG/DL (ref 0.7–1.3)
DIFFERENTIAL METHOD BLD: ABNORMAL
EOSINOPHIL # BLD: 0 K/UL (ref 0–0.4)
EOSINOPHIL NFR BLD: 0 % (ref 0–7)
EPITH CASTS URNS QL MICRO: ABNORMAL /LPF
ERYTHROCYTE [DISTWIDTH] IN BLOOD BY AUTOMATED COUNT: 13.2 % (ref 11.5–14.5)
GLUCOSE SERPL-MCNC: 130 MG/DL (ref 65–100)
GLUCOSE UR STRIP.AUTO-MCNC: NEGATIVE MG/DL
HCT VFR BLD AUTO: 47.2 % (ref 36.6–50.3)
HGB BLD-MCNC: 15.8 G/DL (ref 12.1–17)
HGB UR QL STRIP: ABNORMAL
HYALINE CASTS URNS QL MICRO: ABNORMAL /LPF (ref 0–5)
IMM GRANULOCYTES # BLD AUTO: 0 K/UL (ref 0–0.04)
IMM GRANULOCYTES NFR BLD AUTO: 0 % (ref 0–0.5)
KETONES UR QL STRIP.AUTO: NEGATIVE MG/DL
LEUKOCYTE ESTERASE UR QL STRIP.AUTO: NEGATIVE
LYMPHOCYTES # BLD: 0.9 K/UL (ref 0.8–3.5)
LYMPHOCYTES NFR BLD: 8 % (ref 12–49)
MCH RBC QN AUTO: 30.7 PG (ref 26–34)
MCHC RBC AUTO-ENTMCNC: 33.5 G/DL (ref 30–36.5)
MCV RBC AUTO: 91.7 FL (ref 80–99)
MONOCYTES # BLD: 0.6 K/UL (ref 0–1)
MONOCYTES NFR BLD: 5 % (ref 5–13)
NEUTS SEG # BLD: 9.7 K/UL (ref 1.8–8)
NEUTS SEG NFR BLD: 87 % (ref 32–75)
NITRITE UR QL STRIP.AUTO: NEGATIVE
NRBC # BLD: 0 K/UL (ref 0–0.01)
NRBC BLD-RTO: 0 PER 100 WBC
PH UR STRIP: 7 [PH] (ref 5–8)
PLATELET # BLD AUTO: 194 K/UL (ref 150–400)
PMV BLD AUTO: 9.7 FL (ref 8.9–12.9)
POTASSIUM SERPL-SCNC: 3.8 MMOL/L (ref 3.5–5.1)
PROT UR STRIP-MCNC: NEGATIVE MG/DL
RBC # BLD AUTO: 5.15 M/UL (ref 4.1–5.7)
RBC #/AREA URNS HPF: ABNORMAL /HPF (ref 0–5)
SODIUM SERPL-SCNC: 136 MMOL/L (ref 136–145)
SP GR UR REFRACTOMETRY: 1.02 (ref 1–1.03)
UA: UC IF INDICATED,UAUC: ABNORMAL
UROBILINOGEN UR QL STRIP.AUTO: 0.2 EU/DL (ref 0.2–1)
WBC # BLD AUTO: 11.3 K/UL (ref 4.1–11.1)
WBC URNS QL MICRO: ABNORMAL /HPF (ref 0–4)

## 2019-08-27 PROCEDURE — 99282 EMERGENCY DEPT VISIT SF MDM: CPT

## 2019-08-27 PROCEDURE — 96374 THER/PROPH/DIAG INJ IV PUSH: CPT

## 2019-08-27 PROCEDURE — 99283 EMERGENCY DEPT VISIT LOW MDM: CPT

## 2019-08-27 PROCEDURE — 74011000250 HC RX REV CODE- 250: Performed by: EMERGENCY MEDICINE

## 2019-08-27 PROCEDURE — 51702 INSERT TEMP BLADDER CATH: CPT

## 2019-08-27 PROCEDURE — 74011250636 HC RX REV CODE- 250/636: Performed by: EMERGENCY MEDICINE

## 2019-08-27 PROCEDURE — 51798 US URINE CAPACITY MEASURE: CPT

## 2019-08-27 PROCEDURE — 81001 URINALYSIS AUTO W/SCOPE: CPT

## 2019-08-27 PROCEDURE — 36415 COLL VENOUS BLD VENIPUNCTURE: CPT

## 2019-08-27 PROCEDURE — 80048 BASIC METABOLIC PNL TOTAL CA: CPT

## 2019-08-27 PROCEDURE — 96375 TX/PRO/DX INJ NEW DRUG ADDON: CPT

## 2019-08-27 PROCEDURE — 85025 COMPLETE CBC W/AUTO DIFF WBC: CPT

## 2019-08-27 RX ORDER — MORPHINE SULFATE 2 MG/ML
4 INJECTION, SOLUTION INTRAMUSCULAR; INTRAVENOUS ONCE
Status: COMPLETED | OUTPATIENT
Start: 2019-08-27 | End: 2019-08-27

## 2019-08-27 RX ORDER — ONDANSETRON 2 MG/ML
4 INJECTION INTRAMUSCULAR; INTRAVENOUS
Status: COMPLETED | OUTPATIENT
Start: 2019-08-27 | End: 2019-08-27

## 2019-08-27 RX ORDER — LIDOCAINE HYDROCHLORIDE 20 MG/ML
JELLY TOPICAL
Status: COMPLETED | OUTPATIENT
Start: 2019-08-27 | End: 2019-08-27

## 2019-08-27 RX ADMIN — ONDANSETRON 4 MG: 2 INJECTION INTRAMUSCULAR; INTRAVENOUS at 07:58

## 2019-08-27 RX ADMIN — MORPHINE SULFATE 4 MG: 2 INJECTION, SOLUTION INTRAMUSCULAR; INTRAVENOUS at 07:58

## 2019-08-27 RX ADMIN — LIDOCAINE HYDROCHLORIDE: 20 JELLY TOPICAL at 08:58

## 2019-08-27 NOTE — DISCHARGE INSTRUCTIONS
You were evaluated in the emergency department for abdominal pain due to acute urinary retention. Your examination was reassuring as was your work-up including blood work, and urinalysis. It will be important for you to follow-up with Dr. Cyn Ramey this week, please call the office today to make an appointment as soon as possible. If you develop worsening symptoms such as fevers, chills, or worsening abdominal pain, please return to the emergency department immediately.

## 2019-08-27 NOTE — ED NOTES
RN bladder scanned the patient, patient has +999 in the bladder. New Alisa Spencer placed without issue, currently patent and draining. DAVID Perez at the bedside for assistance. VENITA Workman and Phuc Medeiros MD at the bedside. Patient reports immediate relief.

## 2019-08-27 NOTE — ED NOTES
Patient reported that his pain is significantly improved. Patient placed on 2L NC because O2 saturations dropped to 87-88. Patient resting comfortably. Will continue to monitor and assess patient needs.

## 2019-08-27 NOTE — ED PROVIDER NOTES
EMERGENCY DEPARTMENT HISTORY AND PHYSICAL EXAM      Date: 8/27/2019  Patient Name: Serina Murillo. History of Presenting Illness     Chief Complaint   Patient presents with    Abdominal Pain     pt reports lower abdominal pain beginning last night, last urinated at 10:30 pm, last BM was same    Urinary Retention       History Provided By: Patient and Patient's Wife    HPI: Serina Mcelroy, 75 y.o. male with history of CAD, coronary stents, BPH presents to the ED with cc of lower abdominal pain and urinary retention. Patient states symptoms started last night when he went out to dinner. He states that he had urgency to urinate multiple times during dinner but was not never able to complete a void. On the way home from dinner he had to pull over to try to urinate multiple times without significant relief. His last full void was approximately 2030 hrs. yesterday evening. He states that since then he has had excruciating pain in his lower abdomen with distention and inability to urinate. Denies any recent fevers, chills, recent illness. He did experience nausea but without emesis earlier today. Denies any dysuria, hematuria, passage of blood clots. He has never had urinary retention before however he has been followed previously by urology, Dr. Remington Beltran, and has been taking alfuzosin, he has not been seen by urology in over 10 years. There are no other complaints, changes, or physical findings at this time. PCP: Balaji Flores MD    No current facility-administered medications on file prior to encounter. Current Outpatient Medications on File Prior to Encounter   Medication Sig Dispense Refill    metoprolol tartrate (LOPRESSOR) 25 mg tablet Take 0.5 Tabs by mouth every twelve (12) hours. 90 Tab 4    lisinopril (PRINIVIL, ZESTRIL) 2.5 mg tablet Take 1 Tab by mouth daily. 90 Tab 4    rosuvastatin (CRESTOR) 20 mg tablet Take 1 Tab by mouth nightly.  90 Tab 4    isosorbide mononitrate ER (IMDUR) 30 mg tablet Take 1 Tab by mouth daily. 90 Tab 4    ticagrelor (BRILINTA) 60 mg tab tablet Take 1 Tab by mouth every twelve (12) hours every twelve (12) hours. 180 Tab 4    varicella-zoster recombinant, PF, (SHINGRIX, PF,) 50 mcg/0.5 mL susr injection 0.5 mL by IntraMUSCular route.  gabapentin (NEURONTIN) 300 mg capsule Take 300 mg by mouth every fourty-eight (48) hours.  aspirin delayed-release 81 mg tablet Take 1 Tab by mouth daily. 30 Tab 11    alfuzosin SR (UROXATRAL) 10 mg SR tablet Take  by mouth daily. Past History     Past Medical History:  Past Medical History:   Diagnosis Date    CAD (coronary artery disease)     stent 6/10    Hypertension     Other ill-defined conditions(799.89)     high cholestrol       Past Surgical History:  Past Surgical History:   Procedure Laterality Date    CARDIAC CATHETERIZATION  6/10/2010         DRUG ELUTING STENT SINGLE VESS  6/10/2010         FRACTIONAL FLOW RESERVE  6/10/2010         HC ANGIOSEAL VIP 6FR  6/10/2010         HX TONSILLECTOMY         Family History:  Family History   Problem Relation Age of Onset    Kidney Disease Mother     Cancer Father        Social History:  Social History     Tobacco Use    Smoking status: Current Some Day Smoker     Packs/day: 0.50     Years: 38.00     Pack years: 19.00     Types: Cigarettes    Smokeless tobacco: Never Used    Tobacco comment: 1-2 CIGARETTES DAILY   Substance Use Topics    Alcohol use: No     Comment: since 01/10, 1 beer    Drug use: Not Currently     Types: Prescription, OTC       Allergies: Allergies   Allergen Reactions    Pcn [Penicillins] Swelling         Review of Systems   Review of Systems   Constitutional: Negative for chills and fever. HENT: Negative. Eyes: Negative for visual disturbance. Respiratory: Negative for cough and shortness of breath. Cardiovascular: Negative for chest pain and leg swelling.    Gastrointestinal: Positive for abdominal distention, abdominal pain and nausea. Negative for vomiting. Genitourinary: Positive for difficulty urinating and urgency. Negative for discharge, dysuria and hematuria. Musculoskeletal: Negative for back pain and gait problem. Skin: Negative for color change and rash. Neurological: Negative for dizziness, weakness, light-headedness and headaches. Hematological: Does not bruise/bleed easily. All other systems reviewed and are negative. Physical Exam   Physical Exam   Constitutional: He is oriented to person, place, and time. He appears well-developed and well-nourished. He appears distressed. HENT:   Head: Normocephalic and atraumatic. Eyes: Pupils are equal, round, and reactive to light. EOM are normal.   Neck: Normal range of motion. No JVD present. Cardiovascular: Normal rate, regular rhythm and normal heart sounds. No murmur heard. Pulmonary/Chest: Effort normal and breath sounds normal. No respiratory distress. He has no wheezes. Abdominal: Soft. He exhibits distension. There is tenderness. There is guarding. There is no rebound. Musculoskeletal: Normal range of motion. He exhibits no edema. Neurological: He is alert and oriented to person, place, and time. Skin: Skin is warm and dry. No rash noted. He is not diaphoretic. No erythema.        Diagnostic Study Results     Labs -     Recent Results (from the past 12 hour(s))   CBC WITH AUTOMATED DIFF    Collection Time: 08/27/19  7:55 AM   Result Value Ref Range    WBC 11.3 (H) 4.1 - 11.1 K/uL    RBC 5.15 4.10 - 5.70 M/uL    HGB 15.8 12.1 - 17.0 g/dL    HCT 47.2 36.6 - 50.3 %    MCV 91.7 80.0 - 99.0 FL    MCH 30.7 26.0 - 34.0 PG    MCHC 33.5 30.0 - 36.5 g/dL    RDW 13.2 11.5 - 14.5 %    PLATELET 167 822 - 668 K/uL    MPV 9.7 8.9 - 12.9 FL    NRBC 0.0 0  WBC    ABSOLUTE NRBC 0.00 0.00 - 0.01 K/uL    NEUTROPHILS 87 (H) 32 - 75 %    LYMPHOCYTES 8 (L) 12 - 49 %    MONOCYTES 5 5 - 13 %    EOSINOPHILS 0 0 - 7 % BASOPHILS 0 0 - 1 %    IMMATURE GRANULOCYTES 0 0.0 - 0.5 %    ABS. NEUTROPHILS 9.7 (H) 1.8 - 8.0 K/UL    ABS. LYMPHOCYTES 0.9 0.8 - 3.5 K/UL    ABS. MONOCYTES 0.6 0.0 - 1.0 K/UL    ABS. EOSINOPHILS 0.0 0.0 - 0.4 K/UL    ABS. BASOPHILS 0.0 0.0 - 0.1 K/UL    ABS. IMM. GRANS. 0.0 0.00 - 0.04 K/UL    DF AUTOMATED     METABOLIC PANEL, BASIC    Collection Time: 08/27/19  7:55 AM   Result Value Ref Range    Sodium 136 136 - 145 mmol/L    Potassium 3.8 3.5 - 5.1 mmol/L    Chloride 101 97 - 108 mmol/L    CO2 26 21 - 32 mmol/L    Anion gap 9 5 - 15 mmol/L    Glucose 130 (H) 65 - 100 mg/dL    BUN 17 6 - 20 MG/DL    Creatinine 1.25 0.70 - 1.30 MG/DL    BUN/Creatinine ratio 14 12 - 20      GFR est AA >60 >60 ml/min/1.73m2    GFR est non-AA 58 (L) >60 ml/min/1.73m2    Calcium 9.5 8.5 - 10.1 MG/DL   URINALYSIS W/ REFLEX CULTURE    Collection Time: 08/27/19  7:55 AM   Result Value Ref Range    Color YELLOW/STRAW      Appearance CLEAR CLEAR      Specific gravity 1.016 1.003 - 1.030      pH (UA) 7.0 5.0 - 8.0      Protein NEGATIVE  NEG mg/dL    Glucose NEGATIVE  NEG mg/dL    Ketone NEGATIVE  NEG mg/dL    Bilirubin NEGATIVE  NEG      Blood TRACE (A) NEG      Urobilinogen 0.2 0.2 - 1.0 EU/dL    Nitrites NEGATIVE  NEG      Leukocyte Esterase NEGATIVE  NEG      WBC 0-4 0 - 4 /hpf    RBC 10-20 0 - 5 /hpf    Epithelial cells FEW FEW /lpf    Bacteria NEGATIVE  NEG /hpf    UA:UC IF INDICATED CULTURE NOT INDICATED BY UA RESULT CNI      Hyaline cast 0-2 0 - 5 /lpf       Radiologic Studies -   No orders to display     CT Results  (Last 48 hours)    None        CXR Results  (Last 48 hours)    None          Medical Decision Making   I am the first provider for this patient. I reviewed the vital signs, available nursing notes, past medical history, past surgical history, family history and social history. Vital Signs-Reviewed the patient's vital signs.   Patient Vitals for the past 12 hrs:   Temp Pulse Resp BP SpO2   08/27/19 0807     95 %   08/27/19 0806    105/64    08/27/19 0732 97.6 °F (36.4 °C) 73 18 140/67 100 %       Records Reviewed: Nursing Notes and Old Medical Records    Provider Notes (Medical Decision Making): This is a 67-year-old male here with lower abdominal pain secondary to urinary retention. On presentation patient in significant distress. Bladder scan performed by RN demonstrated greater than 1000 cc, patient unable to void. Spencer catheter placed immediately by RN with drainage of 2499-1706 cc urine which became blood-tinged at the very end. Patient feeling much improved at this time. I will obtain CBC, BMP, urinalysis to look for kidney function and signs of infection. 8:56 AM: On reassessment patient feeling much improved. Repeat abdominal exam does not demonstrate any tenderness, distention, peritoneal findings. Spencer catheter has continued to drain and now with blood-tinged urine. Urinalysis does not show any sign of infection, does show microscopic hematuria. Creatinine 1.25. Very mild leukocytosis noted at 11,000. Patient feeling much improved and is stable for discharge home. Encouraged to follow-up with Dr. Marie Tse, his urologist, as soon as possible this week for reevaluation. He was given strict return ED precautions and he and wife at bedside agree with plan as above. All questions answered. Discharged home in stable condition. ED Course:   Initial assessment performed. The patients presenting problems have been discussed, and they are in agreement with the care plan formulated and outlined with them. I have encouraged them to ask questions as they arise throughout their visit. Critical Care Time:   0    Disposition:  Home with urology follow up    PLAN:  1. Current Discharge Medication List        2.    Follow-up Information     Follow up With Specialties Details Why [place] MD Urology Schedule an appointment as soon as possible for a visit  as soon as possible for evaluation of urinary retention and removal of goldstein catheter [address]  [phone]          Return to ED if worse     Diagnosis     Clinical Impression:   1. Urinary retention        Attestations:    Enoch Luna MD    Please note that this dictation was completed with [employer], the computer voice recognition software. Quite often unanticipated grammatical, syntax, homophones, and other interpretive errors are inadvertently transcribed by the computer software. Please disregard these errors. Please excuse any errors that have escaped final proofreading. Thank you.

## 2019-08-27 NOTE — ED NOTES
Patient discharge instructions reviewed with patient by MD Ginny Jensen. Patient verbalized understanding. Patient changed to leg bag and given full size goldstein bag for at night. Patient wife verbalized understanding of how to switch bags. Patient ambulatory off unit with wife.

## 2020-06-09 NOTE — PROGRESS NOTES
Kate Lao is a 77 y.o. male who was seen by synchronous (real-time) audio-video technology on 6/10/2020     [facility], [address] [place] [address]  [phone]     Subjective:      Kate Lao is a 77 y.o. male is here for routine f/u. The patient denies chest pain/ shortness of breath, orthopnea, PND, LE edema, palpitations, syncope, or presyncope.        Patient Active Problem List    Diagnosis Date Noted    ASHD (arteriosclerotic heart disease) 12/08/2017    Heart problem 05/12/2017    S/P coronary artery stent placement 04/26/2017    Acute ST elevation myocardial infarction (STEMI) involving left anterior descending (LAD) coronary artery ([facility] [address] 04/23/2017    H/O noncompliance with medical treatment, presenting hazards to health 04/23/2017    Coronary artery disease 06/11/2010    Hyperlipidemia with target low density lipoprotein (LDL) cholesterol less than 70 mg/dL 06/11/2010    S/P PTCA (percutaneous transluminal coronary angioplasty) 06/11/2010    Unstable angina ([facility] [address] 06/02/2010    Tobacco use disorder 06/02/2010      Ariane Ruiz MD  Past Medical History:   Diagnosis Date    CAD (coronary artery disease)     stent 6/10    Hypertension     Other ill-defined conditions(799.89)     high cholestrol      Past Surgical History:   Procedure Laterality Date    CARDIAC CATHETERIZATION  6/10/2010         DRUG ELUTING STENT SINGLE VESS  6/10/2010         FRACTIONAL FLOW RESERVE  6/10/2010         HC ANGIOSEAL VIP 6FR  6/10/2010         HX TONSILLECTOMY       Allergies   Allergen Reactions    Pcn [Penicillins] Swelling      Family History   Problem Relation Age of Onset    Kidney Disease Mother     Cancer Father       Social History     Socioeconomic History    Marital status:      Spouse name: Not on file    Number of children: Not on file    Years of education: Not on file    Highest education level: Not on file   Occupational History  Not on file   Social Needs    Financial resource strain: Not on file    Food insecurity     Worry: Not on file     Inability: Not on file    Transportation needs     Medical: Not on file     Non-medical: Not on file   Tobacco Use    Smoking status: Current Some Day Smoker     Packs/day: 0.50     Years: 38.00     Pack years: 19.00     Types: Cigarettes    Smokeless tobacco: Never Used    Tobacco comment: 1-2 CIGARETTES DAILY   Substance and Sexual Activity    Alcohol use: No     Comment: since 01/10, 1 beer    Drug use: Not Currently     Types: Prescription, OTC    Sexual activity: Yes   Lifestyle    Physical activity     Days per week: Not on file     Minutes per session: Not on file    Stress: Not on file   Relationships    Social connections     Talks on phone: Not on file     Gets together: Not on file     Attends Taoist service: Not on file     Active member of club or organization: Not on file     Attends meetings of clubs or organizations: Not on file     Relationship status: Not on file    Intimate partner violence     Fear of current or ex partner: Not on file     Emotionally abused: Not on file     Physically abused: Not on file     Forced sexual activity: Not on file   Other Topics Concern    Not on file   Social History Narrative    Not on file      Current Outpatient Medications   Medication Sig    tamsulosin (FLOMAX) 0.4 mg capsule Take 0.4 mg by mouth daily.  ticagrelor (BRILINTA) 60 mg tab tablet Take 1 Tab by mouth every twelve (12) hours every twelve (12) hours.  metoprolol tartrate (LOPRESSOR) 25 mg tablet Take 0.5 Tabs by mouth every twelve (12) hours.  lisinopril (PRINIVIL, ZESTRIL) 2.5 mg tablet Take 1 Tab by mouth daily.  rosuvastatin (CRESTOR) 20 mg tablet Take 1 Tab by mouth nightly.  aspirin delayed-release 81 mg tablet Take 1 Tab by mouth daily.  isosorbide mononitrate ER (IMDUR) 30 mg tablet Take 1 Tab by mouth daily.  (Patient not taking: Reported on 6/10/2020)    varicella-zoster recombinant, PF, (SHINGRIX, PF,) 50 mcg/0.5 mL susr injection 0.5 mL by IntraMUSCular route. No current facility-administered medications for this visit. Review of Symptoms:  11 systems reviewed, negative other than as stated in the HPI    Physical Exam:    There were no vitals filed for this visit. See patient reported vital signs in nursing note as applicable. There is no height or weight on file to calculate BMI. General PE  Gen:  NAD  Mental Status - Alert. General Appearance - Not in acute distress. HEENT:  PER, EOM, no JVD  Chest and Lung Exam   Inspection: Accessory muscles - No use of accessory muscles in breathing. No audible wheezing. Normal effort in breathing. Symmetric excursion. Cardiovascular:  No JVD  Upper Extremity: Inspection - Bilateral - No Cyanotic nailbeds or Digital clubbing. Lower Extremity:   Palpation: Edema - Bilateral - No edema. Neuro: A&O times 3, CN and motor grossly WNL  Skin: no rashes or lesions on exposed skin, dry, intact  Psych: normal mood, affect. Speech clear.     Labs:   Lab Results   Component Value Date/Time    Cholesterol, total 120 09/07/2017 10:01 AM    Cholesterol, total 174 04/24/2017 04:36 AM    Cholesterol, total 132 03/28/2012 03:00 AM    Cholesterol, total 232 (H) 06/03/2010 04:45 AM    HDL Cholesterol 36 (L) 09/07/2017 10:01 AM    HDL Cholesterol 38 04/24/2017 04:36 AM    HDL Cholesterol 39 (L) 03/28/2012 03:00 AM    HDL Cholesterol 30 06/03/2010 04:45 AM    LDL, calculated 54 09/07/2017 10:01 AM    LDL, calculated 112.4 (H) 04/24/2017 04:36 AM    LDL, calculated 61 03/28/2012 03:00 AM    LDL, calculated 151.2 (H) 06/03/2010 04:45 AM    Triglyceride 148 09/07/2017 10:01 AM    Triglyceride 118 04/24/2017 04:36 AM    Triglyceride 159 (H) 03/28/2012 03:00 AM    Triglyceride 254 (H) 06/03/2010 04:45 AM    CHOL/HDL Ratio 4.6 04/24/2017 04:36 AM    CHOL/HDL Ratio 7.7 (H) 06/03/2010 04:45 AM     Lab Results Component Value Date/Time     (H) 04/24/2017 04:36 AM     Lab Results   Component Value Date/Time    Sodium 136 08/27/2019 07:55 AM    Potassium 3.8 08/27/2019 07:55 AM    Chloride 101 08/27/2019 07:55 AM    CO2 26 08/27/2019 07:55 AM    Anion gap 9 08/27/2019 07:55 AM    Glucose 130 (H) 08/27/2019 07:55 AM    BUN 17 08/27/2019 07:55 AM    Creatinine 1.25 08/27/2019 07:55 AM    BUN/Creatinine ratio 14 08/27/2019 07:55 AM    GFR est AA >60 08/27/2019 07:55 AM    GFR est non-AA 58 (L) 08/27/2019 07:55 AM    Calcium 9.5 08/27/2019 07:55 AM    Bilirubin, total 0.4 09/07/2017 10:01 AM    Alk. phosphatase 67 09/07/2017 10:01 AM    Protein, total 6.5 09/07/2017 10:01 AM    Albumin 4.0 09/07/2017 10:01 AM    Globulin 3.3 04/23/2017 01:40 PM    A-G Ratio 1.6 09/07/2017 10:01 AM    ALT (SGPT) 13 09/07/2017 10:01 AM          Assessment:      1. ASHD (arteriosclerotic heart disease)    2. S/P PTCA (percutaneous transluminal coronary angioplasty)    3. Mixed hyperlipidemia    4. Tobacco use disorder    5. Essential hypertension    6. Acute ST elevation myocardial infarction (STEMI) involving left anterior descending (LAD) coronary artery ([address]    7. S/P coronary artery stent placement        Orders Placed This Encounter    tamsulosin (FLOMAX) 0.4 mg capsule     Sig: Take 0.4 mg by mouth daily. Plan:     Patient presents for a telemedicine visit. Last seen in clinic 6/19---was doing well from CV standpoint. At that time, I decreased his Brilinta to 60  Mg BID in light of Pegasus trial, recall that he had STEMI in 4/17. I also ordered abdominal aortic ultrasound for  AAA screening as he is a smoker with CAD. ASHD   s/p VINCENZO to LAD for STEMI 4/17.    Compliant with meds.     Continue ASA, Brilinta to 60 mg twice a day in light of the Pegasus trial.    Continue BB, Imdur, statin    Echo showed LVEF 55 to 60% no valvular pathology April 2017.      Smoking:  Exercising and eating right.  Rare cigarrette1-2 a day.  Counseled on smoking5 minutes.     68-year-old male smoker with history of ASCVD:  Order abdominal aortic ultrasound for AAA screening.     HTN  Controlled with current therapy  Stable kidney fxn 8/19      Lipids:  Lipid panel received from PCP 5/20/2019 LDL 50, HDL 45, LFTs within normal limits. On statin  Will check labs or defer further statin refill to PCP    Back pain:  7/2017- ruptured disc improved with steroid injection.     Fatigue and weight loss:  Has been stable over the past year, has been supplementing with Premier protein. PCP is aware that at one point he had some weight loss.     Counseled on diet and exercise- eventual goal of 30-60 minutes 5-7 times a week as per AHA guidelines.      Continue current care and f/u in 1 year, sooner as needed. Dickson Keller MD      This virtual visit was conducted with audiovisual connection using do[employer]. me/ [employer] telemedicine services. Pursuant to the emergency declaration under the [facility] waiver authority and the [employer] Act, this Virtual Visit was conducted, with patient's consent, to reduce the patient's risk of exposure to COVID-19 and provide continuity of care for an established patient. Services were provided through a video synchronous discussion virtually to substitute for in-person clinic visit.

## 2020-06-10 ENCOUNTER — VIRTUAL VISIT (OUTPATIENT)
Dept: CARDIOLOGY CLINIC | Age: 65
End: 2020-06-10

## 2020-06-10 DIAGNOSIS — Z13.6 SCREENING FOR AAA (ABDOMINAL AORTIC ANEURYSM): ICD-10-CM

## 2020-06-10 DIAGNOSIS — I21.02 ACUTE ST ELEVATION MYOCARDIAL INFARCTION (STEMI) INVOLVING LEFT ANTERIOR DESCENDING (LAD) CORONARY ARTERY (HCC): ICD-10-CM

## 2020-06-10 DIAGNOSIS — E78.2 MIXED HYPERLIPIDEMIA: ICD-10-CM

## 2020-06-10 DIAGNOSIS — Z95.5 S/P CORONARY ARTERY STENT PLACEMENT: ICD-10-CM

## 2020-06-10 DIAGNOSIS — I25.10 ASHD (ARTERIOSCLEROTIC HEART DISEASE): Primary | ICD-10-CM

## 2020-06-10 DIAGNOSIS — Z98.61 S/P PTCA (PERCUTANEOUS TRANSLUMINAL CORONARY ANGIOPLASTY): ICD-10-CM

## 2020-06-10 DIAGNOSIS — F17.200 TOBACCO USE DISORDER: ICD-10-CM

## 2020-06-10 DIAGNOSIS — I10 ESSENTIAL HYPERTENSION: ICD-10-CM

## 2020-06-10 RX ORDER — TAMSULOSIN HYDROCHLORIDE 0.4 MG/1
0.4 CAPSULE ORAL DAILY
COMMUNITY

## 2020-06-10 NOTE — PROGRESS NOTES
Chief Complaint   Patient presents with    Follow-up    Hypertension    Cholesterol Problem     1. Have you been to the ER, urgent care clinic since your last visit? Hospitalized since your last visit? Yes 8/27/2019    2. Have you seen or consulted any other health care providers outside of the [address] [place] [address] since your last visit? Include any pap smears or colon screening. Yes Urology 2/2020 & PCP unable to get current labs due to [place].

## 2020-08-26 RX ORDER — ROSUVASTATIN CALCIUM 20 MG/1
TABLET, COATED ORAL
Qty: 30 TAB | Refills: 0 | Status: SHIPPED | OUTPATIENT
Start: 2020-08-26 | End: 2020-08-28

## 2021-06-04 ENCOUNTER — OFFICE VISIT (OUTPATIENT)
Dept: CARDIOLOGY CLINIC | Age: 66
End: 2021-06-04
Payer: MEDICARE

## 2021-06-04 VITALS
DIASTOLIC BLOOD PRESSURE: 72 MMHG | HEART RATE: 75 BPM | SYSTOLIC BLOOD PRESSURE: 122 MMHG | RESPIRATION RATE: 16 BRPM | WEIGHT: 156.8 LBS | HEIGHT: 69 IN | BODY MASS INDEX: 23.22 KG/M2 | OXYGEN SATURATION: 96 %

## 2021-06-04 DIAGNOSIS — Z98.61 S/P PTCA (PERCUTANEOUS TRANSLUMINAL CORONARY ANGIOPLASTY): ICD-10-CM

## 2021-06-04 DIAGNOSIS — I25.10 ASHD (ARTERIOSCLEROTIC HEART DISEASE): Primary | ICD-10-CM

## 2021-06-04 DIAGNOSIS — I10 ESSENTIAL HYPERTENSION: ICD-10-CM

## 2021-06-04 DIAGNOSIS — F17.200 TOBACCO USE DISORDER: ICD-10-CM

## 2021-06-04 DIAGNOSIS — Z13.6 SCREENING FOR AAA (ABDOMINAL AORTIC ANEURYSM): ICD-10-CM

## 2021-06-04 DIAGNOSIS — E78.5 HYPERLIPIDEMIA WITH TARGET LOW DENSITY LIPOPROTEIN (LDL) CHOLESTEROL LESS THAN 70 MG/DL: ICD-10-CM

## 2021-06-04 DIAGNOSIS — E78.2 MIXED HYPERLIPIDEMIA: ICD-10-CM

## 2021-06-04 PROCEDURE — G8420 CALC BMI NORM PARAMETERS: HCPCS | Performed by: INTERNAL MEDICINE

## 2021-06-04 PROCEDURE — 3017F COLORECTAL CA SCREEN DOC REV: CPT | Performed by: INTERNAL MEDICINE

## 2021-06-04 PROCEDURE — G8754 DIAS BP LESS 90: HCPCS | Performed by: INTERNAL MEDICINE

## 2021-06-04 PROCEDURE — G8427 DOCREV CUR MEDS BY ELIG CLIN: HCPCS | Performed by: INTERNAL MEDICINE

## 2021-06-04 PROCEDURE — 99214 OFFICE O/P EST MOD 30 MIN: CPT | Performed by: INTERNAL MEDICINE

## 2021-06-04 PROCEDURE — 1101F PT FALLS ASSESS-DOCD LE1/YR: CPT | Performed by: INTERNAL MEDICINE

## 2021-06-04 PROCEDURE — G8752 SYS BP LESS 140: HCPCS | Performed by: INTERNAL MEDICINE

## 2021-06-04 PROCEDURE — G8510 SCR DEP NEG, NO PLAN REQD: HCPCS | Performed by: INTERNAL MEDICINE

## 2021-06-04 PROCEDURE — G8536 NO DOC ELDER MAL SCRN: HCPCS | Performed by: INTERNAL MEDICINE

## 2021-06-04 PROCEDURE — 93000 ELECTROCARDIOGRAM COMPLETE: CPT | Performed by: INTERNAL MEDICINE

## 2021-06-04 NOTE — LETTER
6/15/2021 Patient: Angela Hurst. YOB: 1955 Date of Visit: 6/4/2021 Kunal Faith MD 
Kevin Via Fax: [phone] Dear Kunal Faith MD, Thank you for referring Mr. Fiona Arcos to [address] [place] [address] for evaluation. My notes for this consultation are attached. If you have questions, please do not hesitate to call me. I look forward to following your patient along with you.  
 
 
Sincerely, 
 
Smith Morse MD

## 2021-06-04 NOTE — PROGRESS NOTES
1. Have you been to the ER, urgent care clinic since your last visit? Hospitalized since your last visit? No.    2. Have you seen or consulted any other health care providers outside of the [address] [place] [address] since your last visit? Include any pap smears or colon screening.    No.        Chief Complaint   Patient presents with    Hypertension    Coronary Artery Disease    Annual Exam     pt denies any cardiac symptoms

## 2021-06-04 NOTE — PROGRESS NOTES
[address] [place] [address], [address] [facility]  [phone]     Subjective:      Truett Litten. is a 74 y.o. male is here for routine f/u. 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 The patient denies chest pain/ shortness of breath, orthopnea, PND, LE edema, palpitations, syncope, or presyncope.        Patient Active Problem List    Diagnosis Date Noted    ASHD (arteriosclerotic heart disease) 12/08/2017    Heart problem 05/12/2017    S/P coronary artery stent placement 04/26/2017    Acute ST elevation myocardial infarction (STEMI) involving left anterior descending (LAD) coronary artery ([facility] [address] 04/23/2017    H/O noncompliance with medical treatment, presenting hazards to health 04/23/2017    Coronary artery disease 06/11/2010    Hyperlipidemia with target low density lipoprotein (LDL) cholesterol less than 70 mg/dL 06/11/2010    S/P PTCA (percutaneous transluminal coronary angioplasty) 06/11/2010    [facility] 06/02/2010    Tobacco use disorder 06/02/2010      Jaye Trevizo MD  Past Medical History:   Diagnosis Date    CAD (coronary artery disease)     stent 6/10    Hypertension     Other ill-defined conditions(799.89)     high cholestrol      Past Surgical History:   Procedure Laterality Date    CARDIAC CATHETERIZATION  6/10/2010         DRUG ELUTING STENT SINGLE VESS  6/10/2010         FRACTIONAL FLOW RESERVE  6/10/2010         HC ANGIOSEAL VIP 6FR  6/10/2010         HX TONSILLECTOMY       Allergies   Allergen Reactions    Pcn [Penicillins] Swelling      Family History   Problem Relation Age of Onset    Kidney Disease Mother     Cancer Father       Social History     Socioeconomic History    Marital status:      Spouse name: Not on file    Number of children: Not on file    Years of education: Not on file    Highest education level: Not on file   Occupational History    Not on file   Tobacco Use    Smoking status: Current Some Day Smoker     Packs/day: 0.50     Years: 38.00     Pack years: 19.00     Types: Cigarettes    Smokeless tobacco: Never Used    Tobacco comment: 1-2 CIGARETTES DAILY most days   Substance and Sexual Activity    Alcohol use: No     Comment: since 01/10, 1 beer    Drug use: Not Currently     Types: Prescription, OTC    Sexual activity: Yes   Other Topics Concern    Not on file   Social History Narrative    Not on file     Social Determinants of Health     Financial Resource Strain:     Difficulty of Paying Living Expenses:    Food Insecurity:     Worried About Running Out of Food in the Last Year:     Ran Out of Food in the Last Year:    Transportation Needs:     Lack of Transportation (Medical):  Lack of Transportation (Non-Medical):    Physical Activity:     Days of Exercise per Week:     Minutes of Exercise per Session:    Stress:     Feeling of Stress :    Social Connections:     Frequency of Communication with Friends and Family:     Frequency of Social Gatherings with Friends and Family:     Attends Congregation Services:     Active Member of Clubs or Organizations:     Attends Club or Organization Meetings:     Marital Status:    Intimate Partner Violence:     Fear of Current or Ex-Partner:     Emotionally Abused:     Physically Abused:     Sexually Abused:       Current Outpatient Medications   Medication Sig    ticagrelor (Brilinta) 60 mg tab tablet TAKE 1 TABLET BY MOUTH EVERY 12 HOURS.  lisinopriL (PRINIVIL, ZESTRIL) 2.5 mg tablet TAKE 1 TABLET BY MOUTH DAILY    rosuvastatin (CRESTOR) 20 mg tablet TAKE 1 TABLET BY MOUTH NIGHTLY    metoprolol tartrate (LOPRESSOR) 25 mg tablet TAKE 1/2 TABLET BY MOUTH EVERY 12 HOURS.  tamsulosin (FLOMAX) 0.4 mg capsule Take 0.4 mg by mouth daily.  aspirin delayed-release 81 mg tablet Take 1 Tab by mouth daily.  varicella-zoster recombinant, PF, (SHINGRIX, PF,) 50 mcg/0.5 mL susr injection 0.5 mL by IntraMUSCular route.  (Patient not taking: Reported on 6/4/2021)     No current facility-administered medications for this visit. Review of Symptoms:  11 systems reviewed, negative other than as stated in the HPI    Physical ExamPhysical Exam:    Vitals:    06/04/21 1515   BP: 122/72   Pulse: 75   Resp: 16   SpO2: 96%   Weight: 156 lb 12.8 oz (71.1 kg)   Height: 5' 9\" (1.753 m)     Body mass index is 23.16 kg/m². General PE  Gen:  NAD  Mental Status - Alert. General Appearance - Not in acute distress. HEENT:  PERRL, no carotid bruits or JVD  Chest and Lung Exam   Inspection: Accessory muscles - No use of accessory muscles in breathing. Auscultation:   Breath sounds: - Normal.   Cardiovascular   Inspection: Jugular vein - Bilateral - Inspection Normal.   Palpation/Percussion:   Apical Impulse: - Normal.   Auscultation: Rhythm - Regular. Heart Sounds - S1 WNL and S2 WNL. No S3 or S4. Murmurs & Other Heart Sounds: Auscultation of the heart reveals - No Murmurs. Peripheral Vascular   Upper Extremity: Inspection - Bilateral - No Cyanotic nailbeds or Digital clubbing. Lower Extremity:   Palpation: Edema - Bilateral - No edema. Abdomen:   Soft, non-tender, bowel sounds are active.   Neuro: A&O times 3, CN and motor grossly WNL    Labs:   Lab Results   Component Value Date/Time    Cholesterol, total 120 09/07/2017 10:01 AM    Cholesterol, total 174 04/24/2017 04:36 AM    Cholesterol, total 132 03/28/2012 03:00 AM    Cholesterol, total 232 (H) 06/03/2010 04:45 AM    HDL Cholesterol 36 (L) 09/07/2017 10:01 AM    HDL Cholesterol 38 04/24/2017 04:36 AM    HDL Cholesterol 39 (L) 03/28/2012 03:00 AM    HDL Cholesterol 30 06/03/2010 04:45 AM    LDL, calculated 54 09/07/2017 10:01 AM    LDL, calculated 112.4 (H) 04/24/2017 04:36 AM    LDL, calculated 61 03/28/2012 03:00 AM    LDL, calculated 151.2 (H) 06/03/2010 04:45 AM    Triglyceride 148 09/07/2017 10:01 AM    Triglyceride 118 04/24/2017 04:36 AM    Triglyceride 159 (H) 03/28/2012 03:00 AM Triglyceride 254 (H) 06/03/2010 04:45 AM    CHOL/HDL Ratio 4.6 04/24/2017 04:36 AM    CHOL/HDL Ratio 7.7 (H) 06/03/2010 04:45 AM     Lab Results   Component Value Date/Time     (H) 04/24/2017 04:36 AM     Lab Results   Component Value Date/Time    Sodium 136 08/27/2019 07:55 AM    Potassium 3.8 08/27/2019 07:55 AM    Chloride 101 08/27/2019 07:55 AM    CO2 26 08/27/2019 07:55 AM    Anion gap 9 08/27/2019 07:55 AM    Glucose 130 (H) 08/27/2019 07:55 AM    BUN 17 08/27/2019 07:55 AM    Creatinine 1.25 08/27/2019 07:55 AM    BUN/Creatinine ratio 14 08/27/2019 07:55 AM    GFR est AA >60 08/27/2019 07:55 AM    GFR est non-AA 58 (L) 08/27/2019 07:55 AM    Calcium 9.5 08/27/2019 07:55 AM    Bilirubin, total 0.4 09/07/2017 10:01 AM    Alk. phosphatase 67 09/07/2017 10:01 AM    Protein, total 6.5 09/07/2017 10:01 AM    Albumin 4.0 09/07/2017 10:01 AM    Globulin 3.3 04/23/2017 01:40 PM    A-G Ratio 1.6 09/07/2017 10:01 AM    ALT (SGPT) 13 09/07/2017 10:01 AM       EKG:  NSR     Assessment:      1. ASHD (arteriosclerotic heart disease)    2. Hyperlipidemia with target low density lipoprotein (LDL) cholesterol less than 70 mg/dL    3. S/P PTCA (percutaneous transluminal coronary angioplasty)    4. Tobacco use disorder    5. Mixed hyperlipidemia    6. Essential hypertension    7. Screening for AAA (abdominal aortic aneurysm)        Orders Placed This Encounter    US EXAM SCREENING AAA     Standing Status:   Future     Standing Expiration Date:   7/4/2022     Order Specific Question:   Reason for Exam     Answer:   please call pt    AMB POC EKG ROUTINE W/ 12 LEADS, INTER & REP     Order Specific Question:   Reason for Exam:     Answer:   routine    ticagrelor (Brilinta) 60 mg tab tablet     Sig: TAKE 1 TABLET BY MOUTH EVERY 12 HOURS. Dispense:  180 Tablet     Refill:  4        Plan:     Refilled Brilinta. AAA screening. Crestor from PCP. 1 year.     Last seen by me 6/2020 for a telemedicine visit:  \"Last seen in clinic 6/19---was doing well from CV standpoint. At that time, I decreased his Brilinta to 60  Mg BID in light of Pegasus trial, recall that he had STEMI in 4/17. \"  I also ordered abdominal aortic ultrasound for  AAA screening as he is a smoker with CAD. He did not get that done yet, so reordering today. Advised if he has not heard within a week, he is to call our office and we will call radiology scheduling.        ASHD   s/p VINCENZO to LAD for STEMI 4/17.    Compliant with meds.    Continue ASA, Brilinta to 60 mg twice a day in light of the Pegasus trial.    Continue BB, Imdur, statin     Echo showed LVEF 55 to 60% no valvular pathology April 2017.        Exercising and eating right.       66-year-old male smoker with history of ASCVD:  Re-order abdominal aortic ultrasound for AAA screening.     HTN  Controlled with current therapy      Lipids:  Lipid panel received from PCP 5/20/2019 LDL 50, HDL 45, LFTs within normal limits. On statin  Will check labs or defer further statin refill to PCP- reports well controlled     Back pain:  7/2017- ruptured disc improved with steroid injection.     Fatigue and weight loss: At visit 6/2020:  \"Has been stable over the past year, has been supplementing with Premier protein.  PCP is aware that at one point he had some weight loss. \"   Follow with PCP.     Counseled on diet and exercise- eventual goal of 30-60 minutes 5-7 times a week as per AHA guidelines.      Follow up in 1 year, sooner as needed.      Johnna Rodrigues MD

## 2021-06-18 ENCOUNTER — TELEPHONE (OUTPATIENT)
Dept: CARDIOLOGY CLINIC | Age: 66
End: 2021-06-18

## 2021-06-18 ENCOUNTER — HOSPITAL ENCOUNTER (OUTPATIENT)
Dept: ULTRASOUND IMAGING | Age: 66
Discharge: HOME | End: 2021-06-18
Attending: INTERNAL MEDICINE
Payer: MEDICARE

## 2021-06-18 DIAGNOSIS — I10 ESSENTIAL HYPERTENSION: ICD-10-CM

## 2021-06-18 DIAGNOSIS — E78.5 HYPERLIPIDEMIA WITH TARGET LOW DENSITY LIPOPROTEIN (LDL) CHOLESTEROL LESS THAN 70 MG/DL: ICD-10-CM

## 2021-06-18 DIAGNOSIS — Z98.61 S/P PTCA (PERCUTANEOUS TRANSLUMINAL CORONARY ANGIOPLASTY): ICD-10-CM

## 2021-06-18 DIAGNOSIS — Z13.6 SCREENING FOR AAA (ABDOMINAL AORTIC ANEURYSM): ICD-10-CM

## 2021-06-18 DIAGNOSIS — E78.2 MIXED HYPERLIPIDEMIA: ICD-10-CM

## 2021-06-18 DIAGNOSIS — F17.200 TOBACCO USE DISORDER: ICD-10-CM

## 2021-06-18 DIAGNOSIS — I25.10 ASHD (ARTERIOSCLEROTIC HEART DISEASE): ICD-10-CM

## 2021-06-18 PROCEDURE — 76706 US ABDL AORTA SCREEN AAA: CPT

## 2021-06-18 NOTE — TELEPHONE ENCOUNTER
----- Message from Maylin Klein NP sent at 6/18/2021 11:46 AM EDT -----  No aneurysm detected per abdominal ultrasound. Some mid plaque build up, he is on statin.

## 2021-11-16 RX ORDER — METOPROLOL TARTRATE 25 MG/1
TABLET, FILM COATED ORAL
Qty: 90 TABLET | Refills: 3 | Status: SHIPPED | OUTPATIENT
Start: 2021-11-16 | End: 2022-09-02 | Stop reason: SDUPTHER

## 2022-03-19 PROBLEM — I51.9 HEART PROBLEM: Status: ACTIVE | Noted: 2017-05-12

## 2022-03-19 PROBLEM — I21.02 ACUTE ST ELEVATION MYOCARDIAL INFARCTION (STEMI) INVOLVING LEFT ANTERIOR DESCENDING (LAD) CORONARY ARTERY (HCC): Status: ACTIVE | Noted: 2017-04-23

## 2022-03-19 PROBLEM — Z91.199 H/O NONCOMPLIANCE WITH MEDICAL TREATMENT, PRESENTING HAZARDS TO HEALTH: Status: ACTIVE | Noted: 2017-04-23

## 2022-03-19 PROBLEM — Z95.5 S/P CORONARY ARTERY STENT PLACEMENT: Status: ACTIVE | Noted: 2017-04-26

## 2022-03-20 PROBLEM — I25.10 ASHD (ARTERIOSCLEROTIC HEART DISEASE): Status: ACTIVE | Noted: 2017-12-08

## 2022-06-13 NOTE — TELEPHONE ENCOUNTER
Patient is calling because he needs a refill on Brilinta 60 mg. Pharmacy is confirmed. Patient said he needs a 90 day supply. Patient has a weeks worth left.     [phone]

## 2022-09-01 ENCOUNTER — TELEPHONE (OUTPATIENT)
Dept: CARDIOLOGY CLINIC | Age: 67
End: 2022-09-01

## 2022-09-01 NOTE — TELEPHONE ENCOUNTER
Patient is requesting refills on his medications for his upcoming apts. Patient needs all medicines refill. Pharmacy is confirmed. Patient is schedule for 11/7/22 with CARMELA Rivera.     [phone]

## 2022-09-02 RX ORDER — LISINOPRIL 2.5 MG/1
2.5 TABLET ORAL DAILY
Qty: 90 TABLET | Refills: 0 | Status: SHIPPED | OUTPATIENT
Start: 2022-09-02

## 2022-09-02 RX ORDER — ROSUVASTATIN CALCIUM 20 MG/1
TABLET, COATED ORAL
Qty: 90 TABLET | Refills: 0 | Status: SHIPPED | OUTPATIENT
Start: 2022-09-02

## 2022-09-02 RX ORDER — METOPROLOL TARTRATE 25 MG/1
12.5 TABLET, FILM COATED ORAL 2 TIMES DAILY
Qty: 90 TABLET | Refills: 0 | Status: SHIPPED | OUTPATIENT
Start: 2022-09-02

## 2022-09-02 NOTE — TELEPHONE ENCOUNTER
Refill Request Received for the Following Medication     Requested Prescriptions     Pending Prescriptions Disp Refills    ticagrelor (Brilinta) 60 mg tab tablet 180 Tablet 0     Sig: TAKE 1 TABLET BY MOUTH EVERY 12 HOURS.    lisinopriL (PRINIVIL, ZESTRIL) 2.5 mg tablet 90 Tablet 0     Sig: Take 1 Tablet by mouth daily. metoprolol tartrate (LOPRESSOR) 25 mg tablet 90 Tablet 0     Sig: Take 0.5 Tablets by mouth two (2) times a day. rosuvastatin (CRESTOR) 20 mg tablet 90 Tablet 0     Sig: TAKE 1 TABLET BY MOUTH NIGHTLY           Last Appointment With Me:  06-04-21    Future Appointments:  Future Appointments   Date Time Provider Placido Larson   11/7/2022  9:00 AM Naima Valera, [facility] AMB      No further appointments until seen by provider.

## 2022-11-02 NOTE — PROGRESS NOTES
[facility] [address] [place] [address], [address] [place] [address]  [phone]     Subjective:      Nyasia Salmon is a 69 y.o. male is here for routine f/u. Pmhx CAD s/p stent, HTN, HLD and tobacco use. The patient was last seen by us 6/2021. Today, no specific CV complaints. Smokes 1 cig every now and then. 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 No exertional symptoms.        the patient denies chest pain/ shortness of breath, orthopnea, PND, LE edema, palpitations, syncope, or presyncope    Patient Active Problem List    Diagnosis Date Noted    ASHD (arteriosclerotic heart disease) 12/08/2017    Heart problem 05/12/2017    S/P coronary artery stent placement 04/26/2017    Acute ST elevation myocardial infarction (STEMI) involving left anterior descending (LAD) coronary artery ([facility] [address] 04/23/2017    H/O noncompliance with medical treatment, presenting hazards to health 04/23/2017    Hyperlipidemia with target low density lipoprotein (LDL) cholesterol less than 70 mg/dL 06/11/2010    S/P PTCA (percutaneous transluminal coronary angioplasty) 06/11/2010    Unstable angina ([facility] [address] 06/02/2010    Tobacco use disorder 06/02/2010      Evan Castro MD  Past Medical History:   Diagnosis Date    CAD (coronary artery disease)     stent 6/10    Hypertension     Other ill-defined conditions(799.89)     high cholestrol      Past Surgical History:   Procedure Laterality Date    CARDIAC CATHETERIZATION  6/10/2010         DRUG ELUTING STENT SINGLE VESS  6/10/2010         FRACTIONAL FLOW RESERVE  6/10/2010         HC ANGIOSEAL VIP 6FR  6/10/2010         HX TONSILLECTOMY       Allergies   Allergen Reactions    Pcn [Penicillins] Swelling      Family History   Problem Relation Age of Onset    Kidney Disease Mother     Cancer Father       Social History     Socioeconomic History    Marital status:      Spouse name: Not on file    Number of children: Not on file    Years of education: Not on file Highest education level: Not on file   Occupational History    Not on file   Tobacco Use    Smoking status: Some Days     Packs/day: 0.50     Years: 38.00     Pack years: 19.00     Types: Cigarettes    Smokeless tobacco: Never    Tobacco comments:     1-2 CIGARETTES DAILY most days   Substance and Sexual Activity    Alcohol use: No     Comment: since 01/10, 1 beer    Drug use: Not Currently     Types: Prescription, OTC    Sexual activity: Yes   Other Topics Concern    Not on file   Social History Narrative    Not on file     Social Determinants of Health     Financial Resource Strain: Not on file   Food Insecurity: Not on file   Transportation Needs: Not on file   Physical Activity: Not on file   Stress: Not on file   Social Connections: Not on file   Intimate Partner Violence: Not on file   Housing Stability: Not on file      Current Outpatient Medications   Medication Sig    ticagrelor (Brilinta) 60 mg tab tablet TAKE 1 TABLET BY MOUTH EVERY 12 HOURS.    lisinopriL (PRINIVIL, ZESTRIL) 2.5 mg tablet Take 1 Tablet by mouth daily. metoprolol tartrate (LOPRESSOR) 25 mg tablet Take 0.5 Tablets by mouth two (2) times a day. rosuvastatin (CRESTOR) 20 mg tablet TAKE 1 TABLET BY MOUTH NIGHTLY    tamsulosin (FLOMAX) 0.4 mg capsule Take 0.4 mg by mouth daily. varicella-zoster recombinant, PF, (SHINGRIX, PF,) 50 mcg/0.5 mL susr injection 0.5 mL by IntraMUSCular route. (Patient not taking: Reported on 6/4/2021)    aspirin delayed-release 81 mg tablet Take 1 Tab by mouth daily. No current facility-administered medications for this visit. Review of Symptoms:  11 systems reviewed, negative other than as stated in the HPI    Physical ExamPhysical Exam:    There were no vitals filed for this visit. There is no height or weight on file to calculate BMI. General PE  Gen:  NAD  Mental Status - Alert. General Appearance - Not in acute distress.    HEENT:  PERRL, no carotid bruits or JVD  Chest and Lung Exam Inspection: Accessory muscles - No use of accessory muscles in breathing. Auscultation:   Breath sounds: - Normal.   Cardiovascular   Inspection: Jugular vein - Bilateral - Inspection Normal.   Palpation/Percussion:   Apical Impulse: - Normal.   Auscultation: Rhythm - Regular. Heart Sounds - S1 WNL and S2 WNL. No S3 or S4. Murmurs & Other Heart Sounds: Auscultation of the heart reveals - No Murmurs. Peripheral Vascular   Upper Extremity: Inspection - Bilateral - No Cyanotic nailbeds or Digital clubbing. Lower Extremity:   Palpation: Edema - Bilateral - No edema. Abdomen:   Soft, non-tender, bowel sounds are active.   Neuro: A&O times 3, CN and motor grossly WNL    Labs:   Lab Results   Component Value Date/Time    Cholesterol, total 120 09/07/2017 10:01 AM    Cholesterol, total 174 04/24/2017 04:36 AM    Cholesterol, total 132 03/28/2012 03:00 AM    Cholesterol, total 232 (H) 06/03/2010 04:45 AM    HDL Cholesterol 36 (L) 09/07/2017 10:01 AM    HDL Cholesterol 38 04/24/2017 04:36 AM    HDL Cholesterol 39 (L) 03/28/2012 03:00 AM    HDL Cholesterol 30 06/03/2010 04:45 AM    LDL, calculated 54 09/07/2017 10:01 AM    LDL, calculated 112.4 (H) 04/24/2017 04:36 AM    LDL, calculated 61 03/28/2012 03:00 AM    LDL, calculated 151.2 (H) 06/03/2010 04:45 AM    Triglyceride 148 09/07/2017 10:01 AM    Triglyceride 118 04/24/2017 04:36 AM    Triglyceride 159 (H) 03/28/2012 03:00 AM    Triglyceride 254 (H) 06/03/2010 04:45 AM    CHOL/HDL Ratio 4.6 04/24/2017 04:36 AM    CHOL/HDL Ratio 7.7 (H) 06/03/2010 04:45 AM     Lab Results   Component Value Date/Time     (H) 04/24/2017 04:36 AM     Lab Results   Component Value Date/Time    Sodium 136 08/27/2019 07:55 AM    Potassium 3.8 08/27/2019 07:55 AM    Chloride 101 08/27/2019 07:55 AM    CO2 26 08/27/2019 07:55 AM    Anion gap 9 08/27/2019 07:55 AM    Glucose 130 (H) 08/27/2019 07:55 AM    BUN 17 08/27/2019 07:55 AM    Creatinine 1.25 08/27/2019 07:55 AM BUN/Creatinine ratio 14 08/27/2019 07:55 AM    GFR est AA >60 08/27/2019 07:55 AM    GFR est non-AA 58 (L) 08/27/2019 07:55 AM    Calcium 9.5 08/27/2019 07:55 AM    Bilirubin, total 0.4 09/07/2017 10:01 AM    Alk. phosphatase 67 09/07/2017 10:01 AM    Protein, total 6.5 09/07/2017 10:01 AM    Albumin 4.0 09/07/2017 10:01 AM    Globulin 3.3 04/23/2017 01:40 PM    A-G Ratio 1.6 09/07/2017 10:01 AM    ALT (SGPT) 13 09/07/2017 10:01 AM       EKG:         Assessment:          ICD-10-CM ICD-9-CM    1. ASHD (arteriosclerotic heart disease)  I25.10 414.00       2. S/P PTCA (percutaneous transluminal coronary angioplasty)  Z98.61 V45.82       3. Hyperlipidemia with target low density lipoprotein (LDL) cholesterol less than 70 mg/dL  E78.5 272.4       4. Tobacco use disorder  F17.200 305.1       5. Mixed hyperlipidemia  E78.2 272.2       6. Essential hypertension  I10 401.9           No orders of the defined types were placed in this encounter. Plan:     ASHD   s/p VINCENZO to LAD for STEMI 4/17. Compliant with meds. Continue ASA, Brilinta to 60 mg twice a day in light of the Pegasus trial.  wants to maintain DAPT  Continue BB, statin     Echo showed LVEF 55 to 60% no valvular pathology April 2017. Exercising and eating right. 47-year-old male smoker with history of ASCVD:  AAA ultrasound 6/2021:  impression: Arteriosclerosis, no aneurysm. HTN  Controlled with current therapy  Serum Cr 1.17 in 2/2022      HLD  2/2022 LDL 52 On rosuva   Will defer further refill to PCP who follows labs; requesting labs    Back pain:  7/2017- ruptured disc improved with steroid injection. Fatigue and weight loss: At visit 6/2020:  \"Has been stable over the past year, has been supplementing with Premier protein. PCP is aware that at one point he had some weight loss. \"   Follow with PCP. Counseled on diet and exercise- eventual goal of 30-60 minutes 5-7 times a week as per AHA guidelines.         Patient was made aware during visit today that all testing completed would be instantaneously available on their MyChart for review. Discussed that these results will be made available to the provider at the same time. They were advised to wait at least 3 business days to allow for provider's interpretation of results with follow-up before calling our office with concerns about their results.     Continue current care and f/u in 1 yr        Abhijit Petty NP

## 2022-11-07 ENCOUNTER — OFFICE VISIT (OUTPATIENT)
Dept: CARDIOLOGY CLINIC | Age: 67
End: 2022-11-07
Payer: MEDICARE

## 2022-11-07 VITALS
OXYGEN SATURATION: 98 % | HEART RATE: 61 BPM | WEIGHT: 150 LBS | SYSTOLIC BLOOD PRESSURE: 110 MMHG | HEIGHT: 69 IN | BODY MASS INDEX: 22.22 KG/M2 | DIASTOLIC BLOOD PRESSURE: 70 MMHG | RESPIRATION RATE: 18 BRPM

## 2022-11-07 DIAGNOSIS — F17.200 TOBACCO USE DISORDER: ICD-10-CM

## 2022-11-07 DIAGNOSIS — E78.2 MIXED HYPERLIPIDEMIA: ICD-10-CM

## 2022-11-07 DIAGNOSIS — I25.10 ASHD (ARTERIOSCLEROTIC HEART DISEASE): Primary | ICD-10-CM

## 2022-11-07 DIAGNOSIS — I10 ESSENTIAL HYPERTENSION: ICD-10-CM

## 2022-11-07 DIAGNOSIS — Z98.61 S/P PTCA (PERCUTANEOUS TRANSLUMINAL CORONARY ANGIOPLASTY): ICD-10-CM

## 2022-11-07 DIAGNOSIS — E78.5 HYPERLIPIDEMIA WITH TARGET LOW DENSITY LIPOPROTEIN (LDL) CHOLESTEROL LESS THAN 70 MG/DL: ICD-10-CM

## 2022-11-07 PROCEDURE — 3017F COLORECTAL CA SCREEN DOC REV: CPT | Performed by: NURSE PRACTITIONER

## 2022-11-07 PROCEDURE — G8536 NO DOC ELDER MAL SCRN: HCPCS | Performed by: NURSE PRACTITIONER

## 2022-11-07 PROCEDURE — 3078F DIAST BP <80 MM HG: CPT | Performed by: NURSE PRACTITIONER

## 2022-11-07 PROCEDURE — 99214 OFFICE O/P EST MOD 30 MIN: CPT | Performed by: NURSE PRACTITIONER

## 2022-11-07 PROCEDURE — G0463 HOSPITAL OUTPT CLINIC VISIT: HCPCS | Performed by: NURSE PRACTITIONER

## 2022-11-07 PROCEDURE — G8432 DEP SCR NOT DOC, RNG: HCPCS | Performed by: NURSE PRACTITIONER

## 2022-11-07 PROCEDURE — G8420 CALC BMI NORM PARAMETERS: HCPCS | Performed by: NURSE PRACTITIONER

## 2022-11-07 PROCEDURE — 3074F SYST BP LT 130 MM HG: CPT | Performed by: NURSE PRACTITIONER

## 2022-11-07 PROCEDURE — 1123F ACP DISCUSS/DSCN MKR DOCD: CPT | Performed by: NURSE PRACTITIONER

## 2022-11-07 PROCEDURE — G8427 DOCREV CUR MEDS BY ELIG CLIN: HCPCS | Performed by: NURSE PRACTITIONER

## 2022-11-07 PROCEDURE — 1101F PT FALLS ASSESS-DOCD LE1/YR: CPT | Performed by: NURSE PRACTITIONER

## 2022-11-15 RX ORDER — METOPROLOL TARTRATE 25 MG/1
TABLET, FILM COATED ORAL
Qty: 90 TABLET | Refills: 3 | Status: SHIPPED | OUTPATIENT
Start: 2022-11-15

## 2022-11-15 NOTE — TELEPHONE ENCOUNTER
PCP: Olesya Nobles MD    Last appt: 11/7/2022  Future Appointments   Date Time Provider Placido Larson   11/20/2023 10:40 AM Kristen Mercado MD [facility] AMB       Requested Prescriptions     Signed Prescriptions Disp Refills    metoprolol tartrate (LOPRESSOR) 25 mg tablet 90 Tablet 3     Sig: TAKE 1/2 TABLET BY MOUTH TWICE DAILY     Authorizing Provider: Dontrell Menard     Ordering User: SHARIFA SANCHEZ         Other Comments:    Verbal order per provider. Order (medication, dose, route, frequency, amount, refills) repeated and verified twice.

## 2022-12-01 RX ORDER — ROSUVASTATIN CALCIUM 20 MG/1
TABLET, COATED ORAL
Qty: 30 TABLET | Refills: 0 | OUTPATIENT
Start: 2022-12-01

## 2022-12-01 NOTE — TELEPHONE ENCOUNTER
PCP: Enriqueta Bazan MD    Last appt: 11/7/2022  Future Appointments   Date Time Provider Placido Larson   11/20/2023 10:40 AM Amauri Christina, Anjel De Paz MD [facility] AMB       Requested Prescriptions     Refused Prescriptions Disp Refills    rosuvastatin (CRESTOR) 20 mg tablet [Pharmacy Med Name: ROSUVASTATIN 20MG TABLETS] 30 Tablet 0     Sig: TAKE 1 TABLET BY MOUTH EVERY NIGHT     Refused By: Emilio Phillips C     Reason for Refusal: other         Other Comments:  Per last office note 11/7/22:   \"HLD  2/2022 LDL 52 On rosuva   Will defer further refill to PCP who follows labs; requesting labs\"  Asked PCP office to renew this for the pt.  [id number]

## 2022-12-08 ENCOUNTER — APPOINTMENT (OUTPATIENT)
Dept: CT IMAGING | Age: 67
End: 2022-12-08
Attending: STUDENT IN AN ORGANIZED HEALTH CARE EDUCATION/TRAINING PROGRAM
Payer: MEDICARE

## 2022-12-08 ENCOUNTER — APPOINTMENT (OUTPATIENT)
Dept: GENERAL RADIOLOGY | Age: 67
End: 2022-12-08
Attending: STUDENT IN AN ORGANIZED HEALTH CARE EDUCATION/TRAINING PROGRAM
Payer: MEDICARE

## 2022-12-08 ENCOUNTER — HOSPITAL ENCOUNTER (EMERGENCY)
Age: 67
Discharge: HOME | End: 2022-12-08
Attending: STUDENT IN AN ORGANIZED HEALTH CARE EDUCATION/TRAINING PROGRAM
Payer: MEDICARE

## 2022-12-08 VITALS
DIASTOLIC BLOOD PRESSURE: 74 MMHG | HEIGHT: 69 IN | HEART RATE: 88 BPM | RESPIRATION RATE: 20 BRPM | SYSTOLIC BLOOD PRESSURE: 128 MMHG | TEMPERATURE: 98.8 F | OXYGEN SATURATION: 95 % | BODY MASS INDEX: 22.53 KG/M2 | WEIGHT: 152.12 LBS

## 2022-12-08 DIAGNOSIS — S09.90XA INJURY OF HEAD, INITIAL ENCOUNTER: Primary | ICD-10-CM

## 2022-12-08 DIAGNOSIS — S70.01XA HEMATOMA OF RIGHT HIP, INITIAL ENCOUNTER: ICD-10-CM

## 2022-12-08 DIAGNOSIS — S01.01XA LACERATION OF SCALP, INITIAL ENCOUNTER: ICD-10-CM

## 2022-12-08 PROCEDURE — 73502 X-RAY EXAM HIP UNI 2-3 VIEWS: CPT

## 2022-12-08 PROCEDURE — 99284 EMERGENCY DEPT VISIT MOD MDM: CPT

## 2022-12-08 PROCEDURE — 70450 CT HEAD/BRAIN W/O DYE: CPT

## 2022-12-08 NOTE — ED PROVIDER NOTES
EMERGENCY DEPARTMENT HISTORY AND PHYSICAL EXAM      Date: 12/8/2022  Patient Name: Melva Wilkins. History of Presenting Illness     Chief Complaint   Patient presents with    Head Injury     Pt ambulatory to triage w/ cc glf this am. Pt was ambulating in dark and tripped on what he believed was a dog toy, fell forward striking forehead on end table and landing on R hip. Pt has small hematoma and ecchymosis to forehead with about 2 inch lac that is not currently bleeding. Pt ambulatory but notes hematoma to R hip. Pt takes Brilinta d/t cardiac stents. Pt denies any neck pain, no TTP and no extrem weakness/numbness     Hip Pain         HPI: Melva Wilkins., 69 y.o. male presents to the ED with cc of head injury after a fall. He tripped on a dog toy this morning, fell onto the front of his head. Reports some mild pain over the forehead, sustained a laceration there. No active bleeding. He denies loss of consciousness, reports taking Brilinta and aspirin. Denies any pain in the neck or back, no new weakness numbness or tingling in the arms or legs. He is up-to-date on his tetanus shots. Also notes an area of bruise on his right hip without significant bony pain. Denies any other injuries. There are no other complaints, changes, or physical findings at this time. PCP: Josh Jacome MD    No current facility-administered medications on file prior to encounter. Current Outpatient Medications on File Prior to Encounter   Medication Sig Dispense Refill    metoprolol tartrate (LOPRESSOR) 25 mg tablet TAKE 1/2 TABLET BY MOUTH TWICE DAILY 90 Tablet 3    ticagrelor (Brilinta) 60 mg tab tablet TAKE 1 TABLET BY MOUTH EVERY 12 HOURS. 180 Tablet 0    lisinopriL (PRINIVIL, ZESTRIL) 2.5 mg tablet Take 1 Tablet by mouth daily. 90 Tablet 0    rosuvastatin (CRESTOR) 20 mg tablet TAKE 1 TABLET BY MOUTH NIGHTLY 90 Tablet 0    tamsulosin (FLOMAX) 0.4 mg capsule Take 0.4 mg by mouth daily. varicella-zoster recombinant, PF, (SHINGRIX) 50 mcg/0.5 mL susr injection 0.5 mL by IntraMUSCular route. aspirin delayed-release 81 mg tablet Take 1 Tab by mouth daily. 27 Tab 11       Past History     Past Medical History:  Past Medical History:   Diagnosis Date    CAD (coronary artery disease)     stent 6/10    Hypertension     Other ill-defined conditions(799.89)     high cholestrol       Past Surgical History:  Past Surgical History:   Procedure Laterality Date    CARDIAC CATHETERIZATION  6/10/2010         DRUG ELUTING STENT SINGLE VESS  6/10/2010         FRACTIONAL FLOW RESERVE  6/10/2010         HC ANGIOSEAL VIP 6FR  6/10/2010         HX TONSILLECTOMY         Family History:  Family History   Problem Relation Age of Onset    Kidney Disease Mother     Cancer Father        Social History:  Social History     Tobacco Use    Smoking status: Some Days     Packs/day: 0.50     Years: 38.00     Pack years: 19.00     Types: Cigarettes    Smokeless tobacco: Never    Tobacco comments:     1-2 CIGARETTES DAILY most days   Substance Use Topics    Alcohol use: No     Comment: since 01/10, 1 beer    Drug use: Not Currently     Types: Prescription, OTC       Allergies: Allergies   Allergen Reactions    Pcn [Penicillins] Swelling         Review of Systems   no fever  No ear pain  No eye pain  no shortness of breath  no chest pain  no abdominal pain  no dysuria  No rash  No lymphadenopathy  No weight loss    Physical Exam   Physical Exam  Constitutional:       General: He is not in acute distress. Appearance: He is not toxic-appearing. HENT:      Head:      Comments: Mild swelling over the forehead with superficial horizontal linear laceration. No active bleeding, no gaping of wound edges, he is able to move his brow up and down without any gaping of the wound. No other swelling tenderness or deformity over the face or head. Eyes:      Extraocular Movements: Extraocular movements intact.       Pupils: Pupils are equal, round, and reactive to light. Cardiovascular:      Rate and Rhythm: Normal rate and regular rhythm. Pulmonary:      Effort: Pulmonary effort is normal.      Breath sounds: Normal breath sounds. Abdominal:      Palpations: Abdomen is soft. Tenderness: There is no abdominal tenderness. Musculoskeletal:         General: No deformity. Cervical back: Neck supple. Comments: No midline tenderness of the spine. There is a tangerine sized hematoma over the right lateral hip that is mildly tender. No surrounding bony tenderness, full range of motion of this joint, no other tenderness swelling or deformity of the extremities. Skin:     General: Skin is warm and dry. Neurological:      General: No focal deficit present. Mental Status: He is alert and oriented to person, place, and time. Cranial Nerves: No cranial nerve deficit. Comments: 5/5 strength with bicep flexion and extension bilaterally, 5/5 strength with ankle flexion and extension bilaterally. Sensation to light touch intact over upper and lower extremities bilaterally. Psychiatric:         Mood and Affect: Mood normal.       Diagnostic Study Results     Labs -   No results found for this or any previous visit (from the past 24 hour(s)). Radiologic Studies -   XR HIP RT W OR WO PELV 2-3 VWS   Final Result   No acute abnormality. CT HEAD WO CONT   Final Result   1. No evidence of acute intracranial abnormality. CT Results  (Last 48 hours)                 12/08/22 1030  CT HEAD WO CONT Final result    Impression:  1. No evidence of acute intracranial abnormality. Narrative:  EXAM:  CT HEAD WO CONT       INDICATION:   fall       COMPARISON: None. TECHNIQUE: Unenhanced CT of the head was performed using 5 mm images. Brain and   bone windows were generated.   CT dose reduction was achieved through use of a   standardized protocol tailored for this examination and automatic exposure control for dose modulation. FINDINGS:   The ventricles are normal in size and position. Basilar cisterns are patent. No   midline shift. There is no evidence of acute infarct, hemorrhage, or extraaxial   fluid collection. The paranasal sinuses, mastoid air cells, and middle ears are clear. The orbital   contents are within normal limits. There are no significant osseous or   extracranial soft tissue lesions. CXR Results  (Last 48 hours)      None              Medical Decision Making   I am the first provider for this patient. I reviewed the vital signs, available nursing notes, past medical history, past surgical history, family history and social history. Vital Signs-Reviewed the patient's vital signs. Patient Vitals for the past 24 hrs:   Temp Pulse Resp BP SpO2   12/08/22 1016 98.8 °F (37.1 °C) 88 20 128/74 95 %         Provider Notes (Medical Decision Making):   61-year-old presenting with forehead pain after mechanical fall. Concern for closed head injury, concussion, will obtain head CT given his age. X-ray of the right hip will be obtained though I suspect this is likely hematoma, less likely osseous injury. His neurologic exam is intact. No other significant injuries evident on exam.    ED Course:     Initial assessment performed. The patients presenting problems have been discussed, and they are in agreement with the care plan formulated and outlined with them. I have encouraged them to ask questions as they arise throughout their visit. CT head shows no acute intracranial abnormality, x-ray of the hip shows no acute abnormality. He is resting comfortably on reevaluation. He is up-to-date on his tetanus shots. Counseled on wound care. Patient is counseled on supportive care and return precautions. Will return to the ED for any worsening pain, any changes in mentation, weakness or numbness, or any new or worrisome symptoms.  Will followup with primary care doctor within 4 days. Critical Care Time:         Disposition:  Home    PLAN:  1. Discharge Medication List as of 12/8/2022 11:22 AM        2.    Follow-up Information       Follow up With Specialties Details Why Contact Info    Kristine Rodriguez MD Family Medicine Call in 1 day  [id number] [phone]      [facility] EMERGENCY DEPT Emergency Medicine  As needed, If symptoms worsen [address]  [address] [facility]  [phone]          Return to ED if worse     Diagnosis     Clinical Impression: Acute head injury, acute scalp laceration, acute right hip hematoma

## 2023-04-05 LAB — HBA1C MFR BLD HPLC: 6.1 %

## 2023-06-21 NOTE — TELEPHONE ENCOUNTER
Refill Request Received for the Following Medication     Requested Prescriptions     Pending Prescriptions Disp Refills    BRILINTA 60 MG TABS tablet [Pharmacy Med Name: BRILINTA 60MG TABLETS] 180 tablet      Sig: TAKE 1 TABLET BY MOUTH EVERY 12 HOURS       Last Prescribed:11-    Last Appointment With Me:  6/4/2021     Future Appointments:  Future Appointments   Date Time Provider Miesha Gillespie   11/20/2023 10:40 AM Ke Polanco MD [facility]

## 2023-06-22 RX ORDER — TICAGRELOR 60 MG/1
TABLET ORAL
Qty: 180 TABLET | OUTPATIENT
Start: 2023-06-22

## 2023-06-22 RX ORDER — TICAGRELOR 60 MG/1
TABLET ORAL
Qty: 180 TABLET | Refills: 1 | Status: SHIPPED | OUTPATIENT
Start: 2023-06-22

## 2023-06-22 NOTE — TELEPHONE ENCOUNTER
This nurse called PCP's office. Lab work will be fax today. 04- blood work received  Hgb 16.6.     Refill Request Received for the Following Medication     Requested Prescriptions     Pending Prescriptions Disp Refills    BRILINTA 60 MG TABS tablet [Pharmacy Med Name: BRILINTA 60MG TABLETS] 180 tablet 1     Sig: TAKE 1 TABLET BY MOUTH EVERY 12 HOURS       Last Prescribed:09-    Last Appointment With Me:  6/4/2021     Future Appointments:  Future Appointments   Date Time Provider Miesha Gillespie   11/20/2023 10:40 AM Trevon Shaikh MD [facility]

## 2023-08-11 RX ORDER — LISINOPRIL 2.5 MG/1
TABLET ORAL
Qty: 90 TABLET | Refills: 1 | Status: SHIPPED | OUTPATIENT
Start: 2023-08-11

## 2023-08-11 NOTE — TELEPHONE ENCOUNTER
Requested Prescriptions     Signed Prescriptions Disp Refills    lisinopril (PRINIVIL;ZESTRIL) 2.5 MG tablet 90 tablet 1     Sig: TAKE 1 TABLET BY MOUTH DAILY     Authorizing Provider: Deja Lazo     Ordering User: Chad Benites     Verbal order per Alissa Pena NP.     Future Appointments   Date Time Provider [address]  [address]   11/20/2023 10:40 AM Nicole Kunz MD [facility]

## 2023-09-21 LAB — HBA1C MFR BLD HPLC: 6.2 %

## 2023-11-21 ENCOUNTER — TELEPHONE (OUTPATIENT)
Age: 68
End: 2023-11-21

## 2023-11-21 NOTE — TELEPHONE ENCOUNTER
Blood work from  09-22-23 received from [address]   Chol     138  Trig       104  HDL       49  LDL        68  Hgb       16.3, HCT  48.7. records review by physician and sent to scanning.

## 2024-02-21 PROBLEM — I25.2 HISTORY OF ACUTE MYOCARDIAL INFARCTION: Status: ACTIVE | Noted: 2017-04-23

## 2024-03-08 ENCOUNTER — HOSPITAL ENCOUNTER (OUTPATIENT)
Facility: HOSPITAL | Age: 69
End: 2024-03-08
Attending: ORTHOPAEDIC SURGERY
Payer: MEDICARE

## 2024-03-08 DIAGNOSIS — S46.012D STRAIN OF TENDON OF LEFT ROTATOR CUFF, SUBSEQUENT ENCOUNTER: ICD-10-CM

## 2024-03-08 PROCEDURE — 73221 MRI JOINT UPR EXTREM W/O DYE: CPT

## 2024-05-09 NOTE — TELEPHONE ENCOUNTER
Refill Request Received for the Following Medication     Requested Prescriptions     Pending Prescriptions Disp Refills    metoprolol tartrate (LOPRESSOR) 25 MG tablet 90 tablet 1     Sig: Take 0.5 tablets by mouth 2 times daily       Last Prescribed: 11-    Last Appointment With Me:  11/20/2023     Future Appointments:  Future Appointments   Date Time Provider Department Center   11/22/2024 10:20 AM Gino Gallegos MD [facility]

## 2024-11-07 RX ORDER — METOPROLOL TARTRATE 25 MG/1
12.5 TABLET, FILM COATED ORAL 2 TIMES DAILY
Qty: 60 TABLET | Refills: 1 | Status: SHIPPED | OUTPATIENT
Start: 2024-11-07

## 2024-11-07 NOTE — TELEPHONE ENCOUNTER
Refill Request Received for the Following Medication     Requested Prescriptions     Pending Prescriptions Disp Refills    metoprolol tartrate (LOPRESSOR) 25 MG tablet [Pharmacy Med Name: METOPROLOL TARTRATE 25MG TABLETS] 90 tablet 1     Sig: TAKE 1/2 TABLET BY MOUTH TWICE DAILY       Last Prescribed: 05-    Last Appointment With Me:  11/20/2023     Future Appointments:  Future Appointments   Date Time Provider Department Center   11/22/2024 10:20 AM Gino Gallegos MD [facility]

## 2024-12-06 NOTE — TELEPHONE ENCOUNTER
Per dr. Gallegos's office note on 11- \"Labs from PCP reviewed dated 9/16/2024-TSH within normal limits, PSA mildly elevated 6.76, cholesterol 143, HDL 54, triglycerides 106, LDL 68, potassium within normal limits, creatinine 1.15, transaminases within normal limits.,  CBC within normal limits\"  Brilinta refilled.

## 2025-02-13 ENCOUNTER — HOSPITAL ENCOUNTER (EMERGENCY)
Facility: HOSPITAL | Age: 70
Discharge: HOME | End: 2025-02-13
Attending: EMERGENCY MEDICINE
Payer: MEDICARE

## 2025-02-13 VITALS
HEART RATE: 94 BPM | BODY MASS INDEX: 21.65 KG/M2 | SYSTOLIC BLOOD PRESSURE: 100 MMHG | HEIGHT: 69 IN | OXYGEN SATURATION: 98 % | TEMPERATURE: 97.7 F | RESPIRATION RATE: 19 BRPM | WEIGHT: 146.16 LBS | DIASTOLIC BLOOD PRESSURE: 56 MMHG

## 2025-02-13 DIAGNOSIS — R33.8 ACUTE URINARY RETENTION: Primary | ICD-10-CM

## 2025-02-13 LAB
APPEARANCE UR: CLEAR
BACTERIA URNS QL MICRO: NEGATIVE /HPF
BILIRUB UR QL: NEGATIVE
COLOR UR: ABNORMAL
EPITH CASTS URNS QL MICRO: ABNORMAL /LPF
GLUCOSE UR STRIP.AUTO-MCNC: NEGATIVE MG/DL
HGB UR QL STRIP: ABNORMAL
HYALINE CASTS URNS QL MICRO: ABNORMAL /LPF (ref 0–2)
KETONES UR QL STRIP.AUTO: NEGATIVE MG/DL
LEUKOCYTE ESTERASE UR QL STRIP.AUTO: NEGATIVE
NITRITE UR QL STRIP.AUTO: NEGATIVE
PH UR STRIP: 6.5 (ref 5–8)
PROT UR STRIP-MCNC: NEGATIVE MG/DL
RBC #/AREA URNS HPF: ABNORMAL /HPF (ref 0–5)
SP GR UR REFRACTOMETRY: 1.01
URINE CULTURE IF INDICATED: ABNORMAL
UROBILINOGEN UR QL STRIP.AUTO: 0.2 EU/DL (ref 0.2–1)
WBC URNS QL MICRO: ABNORMAL /HPF (ref 0–4)

## 2025-02-13 PROCEDURE — 6360000002 HC RX W HCPCS: Performed by: EMERGENCY MEDICINE

## 2025-02-13 PROCEDURE — 81001 URINALYSIS AUTO W/SCOPE: CPT

## 2025-02-13 PROCEDURE — 96374 THER/PROPH/DIAG INJ IV PUSH: CPT

## 2025-02-13 PROCEDURE — 6370000000 HC RX 637 (ALT 250 FOR IP): Performed by: EMERGENCY MEDICINE

## 2025-02-13 PROCEDURE — 99284 EMERGENCY DEPT VISIT MOD MDM: CPT

## 2025-02-13 RX ORDER — LIDOCAINE HYDROCHLORIDE 20 MG/ML
JELLY TOPICAL PRN
Status: DISCONTINUED | OUTPATIENT
Start: 2025-02-13 | End: 2025-02-13 | Stop reason: HOSPADM

## 2025-02-13 RX ORDER — MORPHINE SULFATE 2 MG/ML
2 INJECTION, SOLUTION INTRAMUSCULAR; INTRAVENOUS
Status: COMPLETED | OUTPATIENT
Start: 2025-02-13 | End: 2025-02-13

## 2025-02-13 RX ADMIN — LIDOCAINE HYDROCHLORIDE: 20 JELLY TOPICAL at 11:06

## 2025-02-13 RX ADMIN — MORPHINE SULFATE 2 MG: 2 INJECTION, SOLUTION INTRAMUSCULAR; INTRAVENOUS at 11:06

## 2025-02-13 ASSESSMENT — PAIN DESCRIPTION - LOCATION: LOCATION: ABDOMEN

## 2025-02-13 ASSESSMENT — LIFESTYLE VARIABLES
HOW OFTEN DO YOU HAVE A DRINK CONTAINING ALCOHOL: NEVER
HOW MANY STANDARD DRINKS CONTAINING ALCOHOL DO YOU HAVE ON A TYPICAL DAY: PATIENT DOES NOT DRINK

## 2025-02-13 ASSESSMENT — PAIN SCALES - GENERAL
PAINLEVEL_OUTOF10: 10
PAINLEVEL_OUTOF10: 9

## 2025-02-13 ASSESSMENT — PAIN DESCRIPTION - ORIENTATION: ORIENTATION: LOWER

## 2025-02-13 NOTE — ED NOTES
Patient provided with leg bag and instructions.  Patient and family verbalize understanding.  Patient stable for discharge home with clement.

## 2025-02-13 NOTE — ED PROVIDER NOTES
[facility] EMERGENCY DEPARTMENT  EMERGENCY DEPARTMENT ENCOUNTER       Pt Name: Bean Park Jr.  MRN: [id number]  Birthdate 1955  Date of evaluation: 2025  Provider: Leeann Iraheta MD   PCP: Sari Nielsen MD  Note Started: 10:59 AM EST 25     CHIEF COMPLAINT       Chief Complaint   Patient presents with    Abdominal Pain     Pt reports being unable to urinate since this morning. Pt reports this has happened before.         HISTORY OF PRESENT ILLNESS: 1 or more elements      History From: patient, History limited by: none     Bean Park Jr. is a 70 y.o. male who presents with inability to urinate and suprapubic pain       Please See MDM for Additional Details of the HPI/PMH  Nursing Notes were all reviewed and agreed with or any disagreements were addressed in the HPI.     REVIEW OF SYSTEMS        Positives and Pertinent negatives as per HPI.    PAST HISTORY     Past Medical History:  Past Medical History:   Diagnosis Date    Acute ST elevation myocardial infarction (STEMI) involving left anterior descending (LAD) coronary artery ([facility]) 2017    CAD (coronary artery disease)     stent 6/10    Hypertension     Other ill-defined conditions(799.89)     high cholestrol       Past Surgical History:  Past Surgical History:   Procedure Laterality Date    ANGIOSEAL VIP 6FR  6/10/2010         CARDIAC CATH PROCEDURE  6/10/2010         DRUG ELUTING STENT SINGLE VESS  6/10/2010         FRACTIONAL FLOW RESERVE  6/10/2010         TONSILLECTOMY         Family History:  Family History   Problem Relation Age of Onset    Cancer Father     Kidney Disease Mother        Social History:  Social History     Tobacco Use    Smoking status: Some Days     Current packs/day: 0.50     Types: Cigarettes    Smokeless tobacco: Never    Tobacco comments:     Quit smokin-2 CIGARETTES DAILY most days   Substance Use Topics    Alcohol use: Yes     Comment: very little    Drug use: Not Currently     Types:

## 2025-02-13 NOTE — ED NOTES
Attempted to insert clement at this time.  Unsuccessful x3.  Spoke with Dr. Iraheta who is aware.  MD to place new orders and will try again.

## 2025-03-04 RX ORDER — LISINOPRIL 2.5 MG/1
2.5 TABLET ORAL DAILY
Qty: 90 TABLET | Refills: 3 | Status: SHIPPED | OUTPATIENT
Start: 2025-03-04

## 2025-07-24 RX ORDER — TICAGRELOR 60 MG/1
60 TABLET, FILM COATED ORAL 2 TIMES DAILY
Qty: 180 TABLET | Refills: 0 | Status: SHIPPED | OUTPATIENT
Start: 2025-07-24

## 2025-07-24 NOTE — TELEPHONE ENCOUNTER
Refill Request Received for the Following Medication     Requested Prescriptions     Pending Prescriptions Disp Refills    ticagrelor (BRILINTA) 60 MG TABS tablet 180 tablet 0     Sig: Take 1 tablet by mouth 2 times daily   Patient needs blood work for further refills.     Last Prescribed: 12-    Last Appointment With Me:  11/22/2024     Future Appointments:  Future Appointments   Date Time Provider Department Center   12/12/2025  9:20 AM Gino Gallegos MD [facility]

## 2025-09-05 ENCOUNTER — APPOINTMENT (OUTPATIENT)
Facility: HOSPITAL | Age: 70
End: 2025-09-05
Payer: MEDICARE

## 2025-09-05 ENCOUNTER — HOSPITAL ENCOUNTER (EMERGENCY)
Facility: HOSPITAL | Age: 70
Discharge: HOME | End: 2025-09-05
Payer: MEDICARE

## 2025-09-05 VITALS
TEMPERATURE: 98.1 F | SYSTOLIC BLOOD PRESSURE: 125 MMHG | DIASTOLIC BLOOD PRESSURE: 67 MMHG | HEART RATE: 82 BPM | RESPIRATION RATE: 15 BRPM | OXYGEN SATURATION: 95 %

## 2025-09-05 DIAGNOSIS — S82.832A CLOSED FRACTURE OF DISTAL END OF LEFT FIBULA, UNSPECIFIED FRACTURE MORPHOLOGY, INITIAL ENCOUNTER: Primary | ICD-10-CM

## 2025-09-05 PROCEDURE — 73610 X-RAY EXAM OF ANKLE: CPT

## 2025-09-05 PROCEDURE — 6370000000 HC RX 637 (ALT 250 FOR IP)

## 2025-09-05 RX ORDER — HYDROCODONE BITARTRATE AND ACETAMINOPHEN 5; 325 MG/1; MG/1
1 TABLET ORAL EVERY 6 HOURS PRN
Qty: 6 TABLET | Refills: 0 | Status: SHIPPED | OUTPATIENT
Start: 2025-09-05 | End: 2025-09-08

## 2025-09-05 RX ORDER — HYDROCODONE BITARTRATE AND ACETAMINOPHEN 5; 325 MG/1; MG/1
1 TABLET ORAL
Refills: 0 | Status: COMPLETED | OUTPATIENT
Start: 2025-09-05 | End: 2025-09-05

## 2025-09-05 RX ADMIN — HYDROCODONE BITARTRATE AND ACETAMINOPHEN 1 TABLET: 5; 325 TABLET ORAL at 10:34

## 2025-09-05 ASSESSMENT — PAIN SCALES - GENERAL: PAINLEVEL_OUTOF10: 9

## 2025-09-05 ASSESSMENT — PAIN - FUNCTIONAL ASSESSMENT: PAIN_FUNCTIONAL_ASSESSMENT: PREVENTS OR INTERFERES SOME ACTIVE ACTIVITIES AND ADLS

## 2025-09-05 ASSESSMENT — PAIN DESCRIPTION - ORIENTATION: ORIENTATION: LEFT

## 2025-09-05 ASSESSMENT — PAIN DESCRIPTION - DESCRIPTORS: DESCRIPTORS: ACHING

## 2025-09-05 ASSESSMENT — PAIN DESCRIPTION - LOCATION: LOCATION: ANKLE

## 2025-09-05 ASSESSMENT — PAIN DESCRIPTION - PAIN TYPE: TYPE: ACUTE PAIN
